# Patient Record
Sex: FEMALE | Race: WHITE | NOT HISPANIC OR LATINO | Employment: FULL TIME | URBAN - METROPOLITAN AREA
[De-identification: names, ages, dates, MRNs, and addresses within clinical notes are randomized per-mention and may not be internally consistent; named-entity substitution may affect disease eponyms.]

---
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
## 2019-02-15 ENCOUNTER — PATIENT MESSAGE (OUTPATIENT)
Dept: FAMILY MEDICINE | Facility: CLINIC | Age: 19
End: 2019-02-15

## 2019-02-16 RX ORDER — ACETAMINOPHEN AND CODEINE PHOSPHATE 120; 12 MG/5ML; MG/5ML
1 SOLUTION ORAL DAILY
Qty: 84 TABLET | Refills: 0 | Status: SHIPPED | OUTPATIENT
Start: 2019-02-16 | End: 2020-01-09

## 2019-03-11 NOTE — TELEPHONE ENCOUNTER
Advised patients mom through my chart.  
Patients mom is needing to switch her daughters birth control. Stated she received it in the mail but she asked for the wrong kind. Daughter can not do Marti because she breaks out. She is requesting we call in Viorele this time, enough to get her to her appt. I informed patients mother I attempted to call [facility] to phone in prescription but Pharmacy tech by the name of Aleyda stated  had to send another prescription if it was being switched. I explained to the tech I was calling from 's office and it had been done before so she gave me a different number which was incorrect. Patients mom is aware Dr. Arevalo will not be back in the office until Thursday morning only. Please advise.  
RX sent to [facility]  
Spoke with the pharmacy waleska Maynard who stated new prescription had to be sent from 's office for Viorele.    
I have reviewed the history, physical exam, assessment and management plans.  I concur with or have edited all elements of her note.

## 2019-03-15 ENCOUNTER — OFFICE VISIT (OUTPATIENT)
Dept: OBSTETRICS AND GYNECOLOGY | Facility: CLINIC | Age: 19
End: 2019-03-15
Payer: COMMERCIAL

## 2019-03-15 VITALS
DIASTOLIC BLOOD PRESSURE: 68 MMHG | SYSTOLIC BLOOD PRESSURE: 116 MMHG | BODY MASS INDEX: 23.91 KG/M2 | HEIGHT: 65 IN | WEIGHT: 143.5 LBS

## 2019-03-15 DIAGNOSIS — Z30.09 ENCOUNTER FOR OTHER GENERAL COUNSELING OR ADVICE ON CONTRACEPTION: Primary | ICD-10-CM

## 2019-03-15 PROCEDURE — 99212 PR OFFICE/OUTPT VISIT, EST, LEVL II, 10-19 MIN: ICD-10-PCS | Mod: S$GLB,,, | Performed by: OBSTETRICS & GYNECOLOGY

## 2019-03-15 PROCEDURE — 99999 PR PBB SHADOW E&M-EST. PATIENT-LVL III: CPT | Mod: PBBFAC,,, | Performed by: OBSTETRICS & GYNECOLOGY

## 2019-03-15 PROCEDURE — 99212 OFFICE O/P EST SF 10 MIN: CPT | Mod: S$GLB,,, | Performed by: OBSTETRICS & GYNECOLOGY

## 2019-03-15 PROCEDURE — 3008F BODY MASS INDEX DOCD: CPT | Mod: CPTII,S$GLB,, | Performed by: OBSTETRICS & GYNECOLOGY

## 2019-03-15 PROCEDURE — 99999 PR PBB SHADOW E&M-EST. PATIENT-LVL III: ICD-10-PCS | Mod: PBBFAC,,, | Performed by: OBSTETRICS & GYNECOLOGY

## 2019-03-15 PROCEDURE — 3008F PR BODY MASS INDEX (BMI) DOCUMENTED: ICD-10-PCS | Mod: CPTII,S$GLB,, | Performed by: OBSTETRICS & GYNECOLOGY

## 2019-03-15 NOTE — PROGRESS NOTES
Chief Complaint   Patient presents with    Contraception       HPI:   Mar Luna 18 y.o.  is here to get second opinion on birth control options. She reports she was on combined ocps for 4 years and she had a lot of mood fluctuations on them and she has a h/o migraines. All of which seemed to be improved once she stopped them. She has been on the mini pill for a year and has occasional AUB on the mini pill. She sometimes is an hour or so late taking it. She can't do needles and doesn't want an implant.       Patient's last menstrual period was 01/30/2019 (exact date).     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    diagnosed by psychiatry    Asthma     mostly exercise-induced now    Dyslexia     Headache, tension-type     Worsening headaches 12/16/2016    followed by neurology       Past Surgical History:   Procedure Laterality Date    TONSILLECTOMY  0 7/2017       Family History   Problem Relation Age of Onset    Breast cancer Paternal Grandmother     Cancer Paternal Grandmother 65        breast    Headaches Mother     No Known Problems Father     Cancer Maternal Grandfather         passed age 74    No Known Problems Sister     No Known Problems Brother     Colon cancer Neg Hx     Ovarian cancer Neg Hx        Social History     Socioeconomic History    Marital status: Single     Spouse name: Not on file    Number of children: Not on file    Years of education: Not on file    Highest education level: Not on file   Social Needs    Financial resource strain: Not on file    Food insecurity - worry: Not on file    Food insecurity - inability: Not on file    Transportation needs - medical: Not on file    Transportation needs - non-medical: Not on file   Occupational History    Not on file   Tobacco Use    Smoking status: Never Smoker    Smokeless tobacco: Never Used   Substance and Sexual Activity    Alcohol use: No    Drug use: No     "Sexual activity: Yes     Partners: Male     Birth control/protection: OCP     Comment: Mother not aware   Other Topics Concern    Not on file   Social History Narrative    Senior year at [employer]       OB History      Para Term  AB Living    0 0 0 0 0 0    SAB TAB Ectopic Multiple Live Births    0 0 0 0             COMPREHENSIVE GYN HISTORY:  PAP History: Denies abnormal Paps.  Infection History: Denies STDs. Denies PID.  Benign History: Denies uterine fibroids. Denies ovarian cysts. Denies endometriosis.   Cancer History: Denies cervical cancer. Denies uterine cancer or hyperplasia. Denies ovarian cancer. Denies vulvar cancer or pre-cancer. Denies vaginal cancer or pre-cancer. Denies breast cancer. Denies colon cancer.  Sexual Activity History:   reports that she currently engages in sexual activity and has had partners who are Male. She reports using the following method of birth control/protection: OCP.   Menstrual History: Age of menarche: 11 years. Every 28 days, flows for 5-7 days. heavy flow.  Dysmenorrhea History: Reports severe dysmenorrhea.  Contraception: mini pill       ROS:  GENERAL: Denies weight gain or weight loss. Feeling well overall.   SKIN: Denies rash or lesions.   HEAD: Denies headache.   CHEST: Denies chest pain   RESPIRATORY: Denies shortness of breath  ABDOMEN: No abdominal pain, constipation, diarrhea, nausea, vomiting or rectal bleeding.   URINARY: No frequency, dysuria, hematuria, or burning on urination.  REPRODUCTIVE: See HPI.   All other ROS negative     PE:   /68   Ht 5' 5" (1.651 m)   Wt 65.1 kg (143 lb 8.3 oz)   LMP 2019 (Exact Date)   BMI 23.88 kg/m²     APPEARANCE: Well nourished, well developed, in no acute distress.  Pelvic: declines exam     UPT: negative     1. Encounter for other general counseling or advice on contraception        Plan:  1. We discussed for 10 minutes the various forms of birth control including mini pill vs combined " pill/patch/nuvaring vs depo-provera, nexplanon and IUDs and condoms. Discussed with her that AUB on progesterone only birth control is very normal but we could do exam and US however she declines. She would like to stay on mini pill for now.

## 2019-05-06 DIAGNOSIS — R51.9 BILATERAL HEADACHE: ICD-10-CM

## 2019-05-06 RX ORDER — AMITRIPTYLINE HYDROCHLORIDE 75 MG/1
75 TABLET ORAL NIGHTLY
Qty: 90 TABLET | Refills: 0 | Status: SHIPPED | OUTPATIENT
Start: 2019-05-06 | End: 2019-08-07 | Stop reason: SDUPTHER

## 2019-08-06 ENCOUNTER — PATIENT MESSAGE (OUTPATIENT)
Dept: NEUROLOGY | Facility: CLINIC | Age: 19
End: 2019-08-06

## 2019-08-06 ENCOUNTER — TELEPHONE (OUTPATIENT)
Dept: FAMILY MEDICINE | Facility: CLINIC | Age: 19
End: 2019-08-06

## 2019-08-07 ENCOUNTER — OFFICE VISIT (OUTPATIENT)
Dept: FAMILY MEDICINE | Facility: CLINIC | Age: 19
End: 2019-08-07
Payer: COMMERCIAL

## 2019-08-07 ENCOUNTER — OFFICE VISIT (OUTPATIENT)
Dept: NEUROLOGY | Facility: CLINIC | Age: 19
End: 2019-08-07
Payer: COMMERCIAL

## 2019-08-07 VITALS
BODY MASS INDEX: 23.13 KG/M2 | HEIGHT: 66 IN | SYSTOLIC BLOOD PRESSURE: 132 MMHG | DIASTOLIC BLOOD PRESSURE: 81 MMHG | HEART RATE: 103 BPM | WEIGHT: 143.94 LBS

## 2019-08-07 VITALS
BODY MASS INDEX: 24.12 KG/M2 | SYSTOLIC BLOOD PRESSURE: 116 MMHG | DIASTOLIC BLOOD PRESSURE: 86 MMHG | WEIGHT: 144.75 LBS | HEART RATE: 84 BPM | OXYGEN SATURATION: 99 % | TEMPERATURE: 99 F | HEIGHT: 65 IN

## 2019-08-07 DIAGNOSIS — L23.9 ALLERGIC CONTACT DERMATITIS, UNSPECIFIED TRIGGER: Primary | ICD-10-CM

## 2019-08-07 DIAGNOSIS — R48.0 DYSLEXIA: Primary | ICD-10-CM

## 2019-08-07 DIAGNOSIS — R51.9 BILATERAL HEADACHE: ICD-10-CM

## 2019-08-07 PROCEDURE — 99213 OFFICE O/P EST LOW 20 MIN: CPT | Mod: S$GLB,,, | Performed by: NURSE PRACTITIONER

## 2019-08-07 PROCEDURE — 3008F BODY MASS INDEX DOCD: CPT | Mod: CPTII,S$GLB,, | Performed by: PSYCHIATRY & NEUROLOGY

## 2019-08-07 PROCEDURE — 99999 PR PBB SHADOW E&M-EST. PATIENT-LVL V: CPT | Mod: PBBFAC,,, | Performed by: NURSE PRACTITIONER

## 2019-08-07 PROCEDURE — 3008F PR BODY MASS INDEX (BMI) DOCUMENTED: ICD-10-PCS | Mod: CPTII,S$GLB,, | Performed by: PSYCHIATRY & NEUROLOGY

## 2019-08-07 PROCEDURE — 99213 PR OFFICE/OUTPT VISIT, EST, LEVL III, 20-29 MIN: ICD-10-PCS | Mod: S$GLB,,, | Performed by: PSYCHIATRY & NEUROLOGY

## 2019-08-07 PROCEDURE — 99999 PR PBB SHADOW E&M-EST. PATIENT-LVL III: ICD-10-PCS | Mod: PBBFAC,,, | Performed by: PSYCHIATRY & NEUROLOGY

## 2019-08-07 PROCEDURE — 3008F PR BODY MASS INDEX (BMI) DOCUMENTED: ICD-10-PCS | Mod: CPTII,S$GLB,, | Performed by: NURSE PRACTITIONER

## 2019-08-07 PROCEDURE — 3008F BODY MASS INDEX DOCD: CPT | Mod: CPTII,S$GLB,, | Performed by: NURSE PRACTITIONER

## 2019-08-07 PROCEDURE — 99213 PR OFFICE/OUTPT VISIT, EST, LEVL III, 20-29 MIN: ICD-10-PCS | Mod: S$GLB,,, | Performed by: NURSE PRACTITIONER

## 2019-08-07 PROCEDURE — 99213 OFFICE O/P EST LOW 20 MIN: CPT | Mod: S$GLB,,, | Performed by: PSYCHIATRY & NEUROLOGY

## 2019-08-07 PROCEDURE — 99999 PR PBB SHADOW E&M-EST. PATIENT-LVL V: ICD-10-PCS | Mod: PBBFAC,,, | Performed by: NURSE PRACTITIONER

## 2019-08-07 PROCEDURE — 99999 PR PBB SHADOW E&M-EST. PATIENT-LVL III: CPT | Mod: PBBFAC,,, | Performed by: PSYCHIATRY & NEUROLOGY

## 2019-08-07 RX ORDER — SUMATRIPTAN 50 MG/1
50 TABLET, FILM COATED ORAL ONCE
Qty: 15 TABLET | Refills: 5 | Status: SHIPPED | OUTPATIENT
Start: 2019-08-07 | End: 2019-09-09 | Stop reason: SDUPTHER

## 2019-08-07 RX ORDER — LANOLIN ALCOHOL/MO/W.PET/CERES
400 CREAM (GRAM) TOPICAL DAILY
Qty: 90 TABLET | Refills: 3 | Status: SHIPPED | OUTPATIENT
Start: 2019-08-07 | End: 2020-01-09

## 2019-08-07 RX ORDER — RIZATRIPTAN BENZOATE 10 MG/1
10 TABLET ORAL
Qty: 9 TABLET | Refills: 2 | Status: SHIPPED | OUTPATIENT
Start: 2019-08-07 | End: 2021-02-19 | Stop reason: ALTCHOICE

## 2019-08-07 RX ORDER — RIBOFLAVIN (VITAMIN B2) 100 MG
200 TABLET ORAL DAILY
Qty: 180 TABLET | Refills: 3 | Status: SHIPPED | OUTPATIENT
Start: 2019-08-07 | End: 2020-01-09

## 2019-08-07 RX ORDER — HYDROXYZINE HYDROCHLORIDE 25 MG/1
25 TABLET, FILM COATED ORAL 3 TIMES DAILY PRN
Qty: 45 TABLET | Refills: 0 | Status: SHIPPED | OUTPATIENT
Start: 2019-08-07 | End: 2020-01-09

## 2019-08-07 RX ORDER — AMITRIPTYLINE HYDROCHLORIDE 75 MG/1
75 TABLET ORAL NIGHTLY
Qty: 90 TABLET | Refills: 3 | Status: SHIPPED | OUTPATIENT
Start: 2019-08-07 | End: 2019-09-09 | Stop reason: SDUPTHER

## 2019-08-07 RX ORDER — AMITRIPTYLINE HYDROCHLORIDE 75 MG/1
75 TABLET ORAL NIGHTLY
Qty: 90 TABLET | Refills: 3 | Status: SHIPPED | OUTPATIENT
Start: 2019-08-07 | End: 2019-08-07 | Stop reason: SDUPTHER

## 2019-08-07 RX ORDER — METHYLPREDNISOLONE 4 MG/1
TABLET ORAL
Qty: 1 PACKAGE | Refills: 0 | Status: SHIPPED | OUTPATIENT
Start: 2019-08-07 | End: 2019-08-28

## 2019-08-07 NOTE — ASSESSMENT & PLAN NOTE
--  Continue current amitriptyline  -- continue current magnesium and riboflavin  -- continue caffeine avoidance

## 2019-08-07 NOTE — PROGRESS NOTES
Subjective:       Patient ID: Mar Luna is a 19 y.o. female.    Chief Complaint: Rash (rash on both legs.)    18 y/o female with history of asthma presents to clinic with c/o rash.    Rash   This is a new problem. The current episode started in the past 7 days. The problem has been gradually improving since onset. The rash is diffuse. The rash is characterized by itchiness and redness. It is unknown if there was an exposure to a precipitant. Pertinent negatives include no cough, diarrhea, fever, joint pain or shortness of breath. Past treatments include nothing.       Current Outpatient Medications   Medication Sig Dispense Refill    albuterol (VENTOLIN HFA) 90 mcg/actuation inhaler Inhale 2 puffs into the lungs every 6 (six) hours as needed for Wheezing. 18 g 3    amitriptyline (ELAVIL) 75 MG tablet Take 1 tablet (75 mg total) by mouth every evening. NEEDS APT FOR FURTHER REFILLS 90 tablet 0    ascorbic acid (VITAMIN C) 500 MG tablet Take 500 mg by mouth once daily.      CHOLECALCIFEROL, VITAMIN D3, (VITAMIN D3 ORAL) Take 1 capsule by mouth once daily.      cyanocobalamin (VITAMIN B-12) 1000 MCG tablet Take 100 mcg by mouth once daily.      fish oil-omega-3 fatty acids 300-1,000 mg capsule Take 1 g by mouth once daily.      garlic 1,000 mg Cap Take 1 capsule by mouth once daily.      hydrOXYzine HCl (ATARAX) 25 MG tablet Take 1 tablet (25 mg total) by mouth 3 (three) times daily as needed for Itching. 45 tablet 0    magnesium oxide (MAG-OX) 400 mg tablet Take 1 tablet (400 mg total) by mouth once daily. 90 tablet 3    methylPREDNISolone (MEDROL DOSEPACK) 4 mg tablet use as directed 1 Package 0    norethindrone (ORTHO MICRONOR) 0.35 mg tablet Take 1 tablet (0.35 mg total) by mouth once daily. 84 tablet 0    riboflavin, vitamin B2, 100 mg Tab tablet Take 2 tablets (200 mg total) by mouth once daily. 180 tablet 3    rizatriptan (MAXALT) 10 MG tablet Take 1 tablet (10 mg total) by mouth as needed for  Migraine. 9 tablet 2    sumatriptan (IMITREX) 50 MG tablet Take 1 tablet (50 mg total) by mouth once. Take tablet at onset of Headache then every 2 hours that headache persists. 15 tablet 5    UNABLE TO FIND Orthotic inserts to be worn daily. 1 Pair 0     No current facility-administered medications for this visit.        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    diagnosed by psychiatry    Asthma     mostly exercise-induced now    Dyslexia     Headache, tension-type     Worsening headaches 12/16/2016    followed by neurology       Past Surgical History:   Procedure Laterality Date    TONSILLECTOMY  0 7/2017       Family History   Problem Relation Age of Onset    Breast cancer Paternal Grandmother     Cancer Paternal Grandmother 65        breast    Headaches Mother     No Known Problems Father     Cancer Maternal Grandfather         passed age 74    No Known Problems Sister     No Known Problems Brother     Colon cancer Neg Hx     Ovarian cancer Neg Hx        Social History     Socioeconomic History    Marital status: Single     Spouse name: Not on file    Number of children: Not on file    Years of education: Not on file    Highest education level: Not on file   Occupational History    Not on file   Social Needs    Financial resource strain: Not on file    Food insecurity:     Worry: Not on file     Inability: Not on file    Transportation needs:     Medical: Not on file     Non-medical: Not on file   Tobacco Use    Smoking status: Never Smoker    Smokeless tobacco: Never Used   Substance and Sexual Activity    Alcohol use: No    Drug use: No    Sexual activity: Yes     Partners: Male     Birth control/protection: OCP     Comment: Mother not aware   Lifestyle    Physical activity:     Days per week: Not on file     Minutes per session: Not on file    Stress: Not on file   Relationships    Social connections:     Talks on phone: Not on  "file     Gets together: Not on file     Attends Sikh service: Not on file     Active member of club or organization: Not on file     Attends meetings of clubs or organizations: Not on file     Relationship status: Not on file   Other Topics Concern    Not on file   Social History Narrative    Senior year at [employer]       Review of Systems   Constitutional: Negative for fever.   HENT: Negative for nosebleeds, postnasal drip, tinnitus and trouble swallowing.    Respiratory: Negative for cough and shortness of breath.    Cardiovascular: Negative for chest pain and leg swelling.   Gastrointestinal: Negative for abdominal pain and diarrhea.   Musculoskeletal: Negative for arthralgias and joint pain.   Skin: Positive for rash.   Neurological: Negative for headaches.   Hematological: Negative for adenopathy. Does not bruise/bleed easily.   Psychiatric/Behavioral: Negative for dysphoric mood.         Objective:     Vitals:    08/07/19 1030   BP: 116/86   Pulse: 84   Temp: 98.6 °F (37 °C)   TempSrc: Oral   SpO2: 99%   Weight: 65.6 kg (144 lb 11.7 oz)   Height: 5' 5" (1.651 m)          Physical Exam   Constitutional: She is oriented to person, place, and time. She appears well-developed and well-nourished.   HENT:   Head: Normocephalic.   Eyes: Pupils are equal, round, and reactive to light. Conjunctivae are normal.   Neck: Normal range of motion. Neck supple.   Cardiovascular: Normal rate and regular rhythm.   Pulmonary/Chest: Effort normal and breath sounds normal.   Musculoskeletal: Normal range of motion.   Neurological: She is alert and oriented to person, place, and time.   Skin: Skin is intact. Capillary refill takes less than 2 seconds. Rash noted. Rash is papular.   Psychiatric: She has a normal mood and affect. Her behavior is normal.         Assessment:         ICD-10-CM ICD-9-CM   1. Allergic contact dermatitis, unspecified trigger L23.9 692.9       Plan:       Allergic contact dermatitis, unspecified " trigger  -     methylPREDNISolone (MEDROL DOSEPACK) 4 mg tablet; use as directed  Dispense: 1 Package; Refill: 0  -     hydrOXYzine HCl (ATARAX) 25 MG tablet; Take 1 tablet (25 mg total) by mouth 3 (three) times daily as needed for Itching.  Dispense: 45 tablet; Refill: 0      Follow up if symptoms worsen or fail to improve.     Patient's Medications   New Prescriptions    HYDROXYZINE HCL (ATARAX) 25 MG TABLET    Take 1 tablet (25 mg total) by mouth 3 (three) times daily as needed for Itching.    METHYLPREDNISOLONE (MEDROL DOSEPACK) 4 MG TABLET    use as directed   Previous Medications    ALBUTEROL (VENTOLIN HFA) 90 MCG/ACTUATION INHALER    Inhale 2 puffs into the lungs every 6 (six) hours as needed for Wheezing.    AMITRIPTYLINE (ELAVIL) 75 MG TABLET    Take 1 tablet (75 mg total) by mouth every evening. NEEDS APT FOR FURTHER REFILLS    ASCORBIC ACID (VITAMIN C) 500 MG TABLET    Take 500 mg by mouth once daily.    CHOLECALCIFEROL, VITAMIN D3, (VITAMIN D3 ORAL)    Take 1 capsule by mouth once daily.    CYANOCOBALAMIN (VITAMIN B-12) 1000 MCG TABLET    Take 100 mcg by mouth once daily.    FISH OIL-OMEGA-3 FATTY ACIDS 300-1,000 MG CAPSULE    Take 1 g by mouth once daily.    GARLIC 1,000 MG CAP    Take 1 capsule by mouth once daily.    MAGNESIUM OXIDE (MAG-OX) 400 MG TABLET    Take 1 tablet (400 mg total) by mouth once daily.    NORETHINDRONE (ORTHO MICRONOR) 0.35 MG TABLET    Take 1 tablet (0.35 mg total) by mouth once daily.    RIBOFLAVIN, VITAMIN B2, 100 MG TAB TABLET    Take 2 tablets (200 mg total) by mouth once daily.    RIZATRIPTAN (MAXALT) 10 MG TABLET    Take 1 tablet (10 mg total) by mouth as needed for Migraine.    SUMATRIPTAN (IMITREX) 50 MG TABLET    Take 1 tablet (50 mg total) by mouth once. Take tablet at onset of Headache then every 2 hours that headache persists.    UNABLE TO FIND    Orthotic inserts to be worn daily.   Modified Medications    No medications on file   Discontinued Medications    No  medications on file

## 2019-08-07 NOTE — PROGRESS NOTES
[facility] NEUROLOGY  [facility] [place]    Date: August 7, 2019   Patient Name: Mar Luna   MRN: [id number]   PCP: Jennifer Coombs  Referring Provider: No ref. provider found    Assessment:   Mar Luna is a 19 y.o. female Presenting in follow-up for the management of chronic migraine headaches.  Patient is currently well controlled.  Will make no changes to her current regimen.  All questions were answered.    Plan:     Problem List Items Addressed This Visit        Neuro    Chronic migraine    Current Assessment & Plan     --  Continue current amitriptyline  -- continue current magnesium and riboflavin  -- continue caffeine avoidance           Other Visit Diagnoses     Dyslexia    -  Primary    Relevant Orders    Ambulatory referral to Psychiatry    Bilateral headache        Relevant Medications    sumatriptan (IMITREX) 50 MG tablet    amitriptyline (ELAVIL) 75 MG tablet        Franklin Kwok MD  [facility]   Department of Neurology    Patient note was created using Dragon Dictation.  Any errors in syntax or even information may not have been identified and edited on initial review prior to signing this note.  Subjective:   HPI:   Ms. Mar Luna is a 19 y.o. female presenting in follow-up for management of chronic migraine headaches.  Patient reports that she is extremely well controlled on her current regimen of magnesium, riboflavin, amitriptyline.  She states that she only experiences a breakthrough headache when she consumes caffeine.  Her headaches rapidly responded Imitrex and Maxalt.  She has no other complaints today.  She does requests referral to Psychiatry who she has seen in the past for evaluation of dyslexia and adjustment disorder.    PAST MEDICAL HISTORY:  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    diagnosed by psychiatry    Asthma     mostly exercise-induced now    Dyslexia     Headache, tension-type      Worsening headaches 12/16/2016    followed by neurology     PAST SURGICAL HISTORY:  Past Surgical History:   Procedure Laterality Date    TONSILLECTOMY  0 7/2017       CURRENT MEDS:  Current Outpatient Medications   Medication Sig Dispense Refill    albuterol (VENTOLIN HFA) 90 mcg/actuation inhaler Inhale 2 puffs into the lungs every 6 (six) hours as needed for Wheezing. 18 g 3    amitriptyline (ELAVIL) 75 MG tablet Take 1 tablet (75 mg total) by mouth every evening. 90 tablet 3    ascorbic acid (VITAMIN C) 500 MG tablet Take 500 mg by mouth once daily.      CHOLECALCIFEROL, VITAMIN D3, (VITAMIN D3 ORAL) Take 1 capsule by mouth once daily.      cyanocobalamin (VITAMIN B-12) 1000 MCG tablet Take 100 mcg by mouth once daily.      fish oil-omega-3 fatty acids 300-1,000 mg capsule Take 1 g by mouth once daily.      garlic 1,000 mg Cap Take 1 capsule by mouth once daily.      hydrOXYzine HCl (ATARAX) 25 MG tablet Take 1 tablet (25 mg total) by mouth 3 (three) times daily as needed for Itching. 45 tablet 0    magnesium oxide (MAG-OX) 400 mg (241.3 mg magnesium) tablet Take 1 tablet (400 mg total) by mouth once daily. 90 tablet 3    methylPREDNISolone (MEDROL DOSEPACK) 4 mg tablet use as directed 1 Package 0    norethindrone (ORTHO MICRONOR) 0.35 mg tablet Take 1 tablet (0.35 mg total) by mouth once daily. 84 tablet 0    riboflavin, vitamin B2, (VITAMIN B-2) 100 mg Tab tablet Take 2 tablets (200 mg total) by mouth once daily. 180 tablet 3    sumatriptan (IMITREX) 50 MG tablet Take 1 tablet (50 mg total) by mouth once. Take tablet at onset of Headache then every 2 hours that headache persists. for 1 dose 15 tablet 5    UNABLE TO FIND Orthotic inserts to be worn daily. 1 Pair 0    rizatriptan (MAXALT) 10 MG tablet Take 1 tablet (10 mg total) by mouth as needed for Migraine. 9 tablet 2     No current facility-administered medications for this visit.      ALLERGIES:  Review of patient's allergies indicates:  "  Allergen Reactions    Codeine Rash     FAMILY HISTORY:  Family History   Problem Relation Age of Onset    Breast cancer Paternal Grandmother     Cancer Paternal Grandmother 65        breast    Headaches Mother     No Known Problems Father     Cancer Maternal Grandfather         passed age 74    No Known Problems Sister     No Known Problems Brother     Colon cancer Neg Hx     Ovarian cancer Neg Hx        SOCIAL HISTORY:  Social History     Tobacco Use    Smoking status: Never Smoker    Smokeless tobacco: Never Used   Substance Use Topics    Alcohol use: No    Drug use: No     Review of Systems:  12 review of systems is negative except for the symptoms mentioned in HPI.      Objective:     Vitals:    08/07/19 1442   BP: 132/81   Pulse: 103   Weight: 65.3 kg (143 lb 15.4 oz)   Height: 5' 6" (1.676 m)     General: NAD, well nourished   Eyes: no tearing, discharge, no erythema   ENT: moist mucous membranes of the oral cavity, nares patent    Neck: Supple, full range of motion  Cardiovascular: Warm and well perfused, pulses equal and symmetrical  Lungs: Normal work of breathing, normal chest wall excursions  Skin: No rash, lesions, or breakdown on exposed skin  Psychiatry: Mood and affect are appropriate   Abdomen: soft, non tender, non distended  Extremeties: No cyanosis, clubbing or edema.    Neurological   MENTAL STATUS: Alert and oriented to person, place, and time. Attention and concentration within normal limits. Speech without dysarthria. Recent and remote memory within normal limits   CRANIAL NERVES: Visual fields intact. PERRL. EOMI. Facial sensation intact. Face symmetrical. Hearing grossly intact. Full shoulder shrug bilaterally. Tongue protrudes midline   SENSORY: Sensation is intact to light touch throughout.    MOTOR: Normal bulk and tone.  5/5 deltoid, biceps, triceps, interosseous, hand  bilaterally. 5/5 iliopsoas, knee extension/flexion, foot dorsi/plantarflexion bilaterally.  "   REFLEXES: Symmetric and 2+ throughout.   CEREBELLAR/COORDINATION/GAIT: Gait steady with normal arm swing and stride length. Finger to nose intact. Normal rapid alternating movements.

## 2019-08-07 NOTE — PATIENT INSTRUCTIONS
"  Contact Dermatitis  Contact dermatitis is a skin rash caused by something that touches the skin and makes it irritated and inflamed. Your skin may be red, swollen, dry, and may be cracked. Blisters may form and ooze. The rash will itch.  Contact dermatitis can form on the face and neck, backs of hands, forearms, genitals, and lower legs.  People can get contact dermatitis from lots of sources. These include:  · Plants such as poison ivy, oak, or sumac  · Chemicals in hair dyes and rinses, soaps, solvents, waxes, fingernail polish, and deodorants   · Jewelry or watchbands made of nickel  Contact dermatitis is not passed from person to person.  Talk with your healthcare provider about what may have caused the rash. A type of allergy testing called "patch testing" may be used to discover what you are allergic to. You will need to avoid the source of your rash in the future to prevent it from coming back.  Treatment is done to relieve itching and prevent the rash from coming back. The rash should go away in a few days to a few weeks.  Home care  Your healthcare provider may prescribe medicine to relieve swelling and itching. Follow all instructions when using these medicines.  General care:  · Avoid anything that heats up your skin, such as hot showers or baths, or direct sunlight. This can make itching worse.  · Apply cold compresses to soothe your sores to help relieve your symptoms. Do this for 30 minutes 3 to 4 times a day. You can make a cold compress by soaking a cloth in cold water. Squeeze out excess water. You can add colloidal oatmeal to the water to help reduce itching. For severe itching in a small area, apply an ice pack wrapped in a thin towel. Do this for 20 minutes 3 to 4 times a day.  · You can also try wet dressings. One way to do this is to wear a wet piece of clothing under a dry one. Wear a damp shirt under a dry shirt if your upper body is affected. This can relieve itching and prevent you from " scratching the affected area.  · You can also help relieve large areas of itching by taking a lukewarm bath with colloidal oatmeal added to the water.  · Use hydrocortisone cream for redness and irritation, unless another medicine was prescribed. You can also use benzocaine anesthetic cream or spray. Calamine lotion can also relieve mild symptoms.  · Use oral diphenhydramine to help reduce itching. You can buy this antihistamine at drug and grocery stores. It can make you sleepy, so use lower doses during the daytime. Or you can use loratadine. This is an antihistamine that will not make you sleepy. Do not use diphenhydramine if you have glaucoma or have trouble urinating due to an enlarged prostate.  · If a plant causes your rash, make sure to wash your skin and the clothes you were wearing when you came into contact with the plant. This is to wash away the plant oils that gave you the rash and prevent more or worse symptoms.  · Stay away from the substance or object that causes your symptoms. If you cant avoid it, wear gloves or some other type of protection.  Follow-up care  Follow up with your healthcare provider, or as advised.  When to seek medical advice  Call your healthcare provider right away if any of these occur:  · Spreading of the rash to other parts of your body  · Severe swelling of your face, eyelids, mouth, throat or tongue  · Trouble urinating due to swelling in the genital area  · Fever of 100.4°F (38°C) or higher  · Redness or swelling that gets worse  · Pain that gets worse  · Foul-smelling fluid leaking from the skin  · Yellow-brown crusts on the open blisters  Date Last Reviewed: 9/1/2016  © 4590-4742 [employer]. [address] [place] [address], [place] [address]. All rights reserved. This information is not intended as a substitute for professional medical care. Always follow your healthcare professional's instructions.

## 2019-09-04 ENCOUNTER — PATIENT MESSAGE (OUTPATIENT)
Dept: NEUROLOGY | Facility: CLINIC | Age: 19
End: 2019-09-04

## 2019-09-04 DIAGNOSIS — R51.9 BILATERAL HEADACHE: ICD-10-CM

## 2019-09-09 RX ORDER — AMITRIPTYLINE HYDROCHLORIDE 75 MG/1
75 TABLET ORAL NIGHTLY
Qty: 90 TABLET | Refills: 3 | Status: SHIPPED | OUTPATIENT
Start: 2019-09-09 | End: 2020-01-09

## 2019-09-09 RX ORDER — SUMATRIPTAN 50 MG/1
50 TABLET, FILM COATED ORAL ONCE
Qty: 15 TABLET | Refills: 5 | Status: SHIPPED | OUTPATIENT
Start: 2019-09-09 | End: 2020-11-24 | Stop reason: SDUPTHER

## 2020-01-02 ENCOUNTER — TELEPHONE (OUTPATIENT)
Dept: OBSTETRICS AND GYNECOLOGY | Facility: CLINIC | Age: 20
End: 2020-01-02

## 2020-01-02 NOTE — TELEPHONE ENCOUNTER
----- Message from Rian Crowe Patient Care Assistant sent at 1/2/2020  9:19 AM CST -----  Contact: Patient Portal  ----- Message -----     From: Mar Luna     Sent: 1/1/2020 10:26 AM CST       To: Patient Appointment Schedule Request Mailing List  Subject: Appointment Request    Appointment Request From: Mar Luna    With Provider: Ava    Preferred Date Range: 1/1/2020 - 1/10/2020    Preferred Times: Any time    Reason for visit: Positive pregnancy test    Comments:  Pregnancy

## 2020-01-02 NOTE — TELEPHONE ENCOUNTER
Called pt. Scheduled pt 1/13/20 at 1400 in [facility]. Pt verbalized understanding.

## 2020-01-09 ENCOUNTER — PATIENT MESSAGE (OUTPATIENT)
Dept: OBSTETRICS AND GYNECOLOGY | Facility: CLINIC | Age: 20
End: 2020-01-09

## 2020-01-09 ENCOUNTER — OFFICE VISIT (OUTPATIENT)
Dept: OBSTETRICS AND GYNECOLOGY | Facility: CLINIC | Age: 20
End: 2020-01-09
Payer: COMMERCIAL

## 2020-01-09 VITALS
SYSTOLIC BLOOD PRESSURE: 110 MMHG | BODY MASS INDEX: 23.69 KG/M2 | DIASTOLIC BLOOD PRESSURE: 68 MMHG | WEIGHT: 146.81 LBS

## 2020-01-09 DIAGNOSIS — Z32.01 POSITIVE URINE PREGNANCY TEST: ICD-10-CM

## 2020-01-09 DIAGNOSIS — N91.2 AMENORRHEA: Primary | ICD-10-CM

## 2020-01-09 DIAGNOSIS — Z01.419 WELL WOMAN EXAM WITH ROUTINE GYNECOLOGICAL EXAM: ICD-10-CM

## 2020-01-09 LAB
B-HCG UR QL: POSITIVE
CTP QC/QA: YES

## 2020-01-09 PROCEDURE — 87086 URINE CULTURE/COLONY COUNT: CPT

## 2020-01-09 PROCEDURE — 99395 PR PREVENTIVE VISIT,EST,18-39: ICD-10-PCS | Mod: S$GLB,,, | Performed by: OBSTETRICS & GYNECOLOGY

## 2020-01-09 PROCEDURE — 99999 PR PBB SHADOW E&M-EST. PATIENT-LVL III: CPT | Mod: PBBFAC,,, | Performed by: OBSTETRICS & GYNECOLOGY

## 2020-01-09 PROCEDURE — 99999 PR PBB SHADOW E&M-EST. PATIENT-LVL III: ICD-10-PCS | Mod: PBBFAC,,, | Performed by: OBSTETRICS & GYNECOLOGY

## 2020-01-09 PROCEDURE — 81025 POCT URINE PREGNANCY: ICD-10-PCS | Mod: S$GLB,,, | Performed by: OBSTETRICS & GYNECOLOGY

## 2020-01-09 PROCEDURE — 81025 URINE PREGNANCY TEST: CPT | Mod: S$GLB,,, | Performed by: OBSTETRICS & GYNECOLOGY

## 2020-01-09 PROCEDURE — 99395 PREV VISIT EST AGE 18-39: CPT | Mod: S$GLB,,, | Performed by: OBSTETRICS & GYNECOLOGY

## 2020-01-09 PROCEDURE — 87491 CHLMYD TRACH DNA AMP PROBE: CPT | Mod: 59

## 2020-01-09 PROCEDURE — 80307 DRUG TEST PRSMV CHEM ANLYZR: CPT

## 2020-01-09 PROCEDURE — 88175 CYTOPATH C/V AUTO FLUID REDO: CPT

## 2020-01-09 PROCEDURE — 87481 CANDIDA DNA AMP PROBE: CPT | Mod: 59

## 2020-01-09 NOTE — PROGRESS NOTES
CC: Annual check-up    SUBJECTIVE:   19 y.o. female   for annual routine Pap and checkup. Patient's last menstrual period was 2019 (exact date)..  EGA is 8 6/7 wks  and EDC is 19. She has no unusual complaints.        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    diagnosed by psychiatry    Asthma     mostly exercise-induced now    Dyslexia     Headache, tension-type     Worsening headaches 2016    followed by neurology     Past Surgical History:   Procedure Laterality Date    TONSILLECTOMY  0 2017     Social History     Socioeconomic History    Marital status: Single     Spouse name: Not on file    Number of children: Not on file    Years of education: Not on file    Highest education level: Not on file   Occupational History    Not on file   Social Needs    Financial resource strain: Not on file    Food insecurity:     Worry: Not on file     Inability: Not on file    Transportation needs:     Medical: Not on file     Non-medical: Not on file   Tobacco Use    Smoking status: Never Smoker    Smokeless tobacco: Never Used   Substance and Sexual Activity    Alcohol use: No    Drug use: No    Sexual activity: Yes     Partners: Male     Birth control/protection: OCP     Comment: Mother not aware   Lifestyle    Physical activity:     Days per week: Not on file     Minutes per session: Not on file    Stress: Not on file   Relationships    Social connections:     Talks on phone: Not on file     Gets together: Not on file     Attends Oriental orthodox service: Not on file     Active member of club or organization: Not on file     Attends meetings of clubs or organizations: Not on file     Relationship status: Not on file   Other Topics Concern    Not on file   Social History Narrative    Senior year at [employer]     Family History   Problem Relation Age of Onset    Breast cancer Paternal Grandmother     Cancer Paternal Grandmother 65         breast    Headaches Mother     No Known Problems Father     Cancer Maternal Grandfather         passed age 74    No Known Problems Sister     No Known Problems Brother     Colon cancer Neg Hx     Ovarian cancer Neg Hx      OB History    Para Term  AB Living   1 0 0 0 0 0   SAB TAB Ectopic Multiple Live Births   0 0 0 0        # Outcome Date GA Lbr Mich/2nd Weight Sex Delivery Anes PTL Lv   1 Current                  Current Outpatient Medications   Medication Sig Dispense Refill    rizatriptan (MAXALT) 10 MG tablet Take 1 tablet (10 mg total) by mouth as needed for Migraine. 9 tablet 2    sumatriptan (IMITREX) 50 MG tablet Take 1 tablet (50 mg total) by mouth once. Take tablet at onset of Headache then every 2 hours that headache persists. for 1 dose 15 tablet 5     No current facility-administered medications for this visit.      Allergies: Patient has no known allergies.     ROS:  Constitutional: no weight loss, weight gain, fever, fatigue  Eyes:  No vision changes, glasses/contacts  ENT/Mouth: No ulcers, sinus problems, ears ringing, headache  Cardiovascular: No inability to lie flat, chest pain, exercise intolerance, swelling, heart palpitations  Respiratory: No wheezing, coughing blood, shortness of breath, or cough  Gastrointestinal: No diarrhea, bloody stool, nausea/vomiting, constipation, gas, hemorrhoids  Genitourinary: No blood in urine, painful urination, urgency of urination, frequency of urination, incomplete emptying, incontinence, abnormal bleeding, painful periods, heavy periods, vaginal discharge, vaginal odor, painful intercourse, sexual problems, bleeding after intercourse.  Musculoskeletal: No muscle weakness  Skin/Breast: No painful breasts, nipple discharge, masses, rash, ulcers  Neurological: No passing out, seizures, numbness, headache  Endocrine: No diabetes, hypothyroid, hyperthyroid, hot flashes, hair loss, abnormal hair growth, ance  Psychiatric: No  depression, crying  Hematologic: No bruises, bleeding, swollen lymph nodes, anemia.      OBJECTIVE:   The patient appears well, alert, oriented x 3, in no distress.  /68   Wt 66.6 kg (146 lb 12.8 oz)   LMP 11/08/2019 (Exact Date)   BMI 23.69 kg/m²   NECK: no thyromegaly, trachea midline  SKIN: no acne, striae, hirsutism  BREAST EXAM: not examined  ABDOMEN: no hernias, masses, or hepatosplenomegaly  GENITALIA: normal external genitalia, no erythema, no discharge  URETHRA: normal urethra, normal urethral meatus  VAGINA: Normal  CERVIX: no lesions or cervical motion tenderness  UTERUS: normal 6-8 wks  ADNEXA: normal adnexa      ASSESSMENT:   well woman  1. Amenorrhea    2. Well woman exam with routine gynecological exam    3. Positive urine pregnancy test        PLAN:   pap smear  additional lab tests per orders  return annually or prn  Orders Placed This Encounter    C. trachomatis/N. gonorrhoeae by AMP DNA [facility]; Vagina    Urine culture    Vaginosis Screen by DNA Probe    US OB <14 Wks, TransAbd, Single Gestation    Drug screen panel, emergency    POCT urine pregnancy    Liquid-Based Pap Smear, Screening

## 2020-01-10 ENCOUNTER — HOSPITAL ENCOUNTER (OUTPATIENT)
Dept: RADIOLOGY | Facility: HOSPITAL | Age: 20
Discharge: HOME | End: 2020-01-10
Attending: OBSTETRICS & GYNECOLOGY
Payer: COMMERCIAL

## 2020-01-10 DIAGNOSIS — N91.2 AMENORRHEA: ICD-10-CM

## 2020-01-10 LAB
AMPHET+METHAMPHET UR QL: NEGATIVE
BACTERIA UR CULT: NO GROWTH
BARBITURATES UR QL SCN>200 NG/ML: NEGATIVE
BENZODIAZ UR QL SCN>200 NG/ML: NEGATIVE
BZE UR QL SCN: NEGATIVE
CANNABINOIDS UR QL SCN: NEGATIVE
CREAT UR-MCNC: 15 MG/DL (ref 15–325)
METHADONE UR QL SCN>300 NG/ML: NEGATIVE
OPIATES UR QL SCN: NEGATIVE
PCP UR QL SCN>25 NG/ML: NEGATIVE
TOXICOLOGY INFORMATION: NORMAL

## 2020-01-10 PROCEDURE — 76801 OB US < 14 WKS SINGLE FETUS: CPT | Mod: TC,PO

## 2020-01-11 LAB
BACTERIAL VAGINOSIS DNA: NEGATIVE
C TRACH DNA SPEC QL NAA+PROBE: NOT DETECTED
CANDIDA GLABRATA DNA: NEGATIVE
CANDIDA KRUSEI DNA: NEGATIVE
CANDIDA RRNA VAG QL PROBE: NEGATIVE
N GONORRHOEA DNA SPEC QL NAA+PROBE: NOT DETECTED
T VAGINALIS RRNA GENITAL QL PROBE: NEGATIVE

## 2020-01-13 ENCOUNTER — TELEPHONE (OUTPATIENT)
Dept: OBSTETRICS AND GYNECOLOGY | Facility: CLINIC | Age: 20
End: 2020-01-13

## 2020-01-13 DIAGNOSIS — Z32.01 POSITIVE URINE PREGNANCY TEST: Primary | ICD-10-CM

## 2020-01-13 NOTE — TELEPHONE ENCOUNTER
Called patient, informed patient provider would like her to have a repeat ultrasound next week with a visit after. Ultrasound appointment scheduled for 1/22/2020 at 10:15am and patient informed to come to [facility] once she is done with ultrasound. Patient verbalized understanding.

## 2020-01-22 ENCOUNTER — ROUTINE PRENATAL (OUTPATIENT)
Dept: OBSTETRICS AND GYNECOLOGY | Facility: CLINIC | Age: 20
End: 2020-01-22
Payer: COMMERCIAL

## 2020-01-22 ENCOUNTER — HOSPITAL ENCOUNTER (OUTPATIENT)
Dept: RADIOLOGY | Facility: HOSPITAL | Age: 20
Discharge: HOME | End: 2020-01-22
Attending: OBSTETRICS & GYNECOLOGY
Payer: COMMERCIAL

## 2020-01-22 DIAGNOSIS — O30.041 DICHORIONIC DIAMNIOTIC TWIN PREGNANCY IN FIRST TRIMESTER: ICD-10-CM

## 2020-01-22 DIAGNOSIS — Z3A.01 7 WEEKS GESTATION OF PREGNANCY: Primary | ICD-10-CM

## 2020-01-22 DIAGNOSIS — Z32.01 POSITIVE URINE PREGNANCY TEST: ICD-10-CM

## 2020-01-22 PROCEDURE — 0502F SUBSEQUENT PRENATAL CARE: CPT | Mod: CPTII,S$GLB,, | Performed by: OBSTETRICS & GYNECOLOGY

## 2020-01-22 PROCEDURE — 99999 PR PBB SHADOW E&M-EST. PATIENT-LVL II: CPT | Mod: PBBFAC,,, | Performed by: OBSTETRICS & GYNECOLOGY

## 2020-01-22 PROCEDURE — 99999 PR PBB SHADOW E&M-EST. PATIENT-LVL II: ICD-10-PCS | Mod: PBBFAC,,, | Performed by: OBSTETRICS & GYNECOLOGY

## 2020-01-22 PROCEDURE — 0502F PR SUBSEQUENT PRENATAL CARE: ICD-10-PCS | Mod: CPTII,S$GLB,, | Performed by: OBSTETRICS & GYNECOLOGY

## 2020-01-22 PROCEDURE — 76801 OB US < 14 WKS SINGLE FETUS: CPT | Mod: TC,PO

## 2020-01-22 NOTE — PROGRESS NOTES
Doing well  Has a good apetite  U/s done Di Di twins at 7 /17 wks  rtc 4 wks    Discussed: Blood test ordered on first visit, Vitamins, folic acid, Wt Gain, Seafood and mercury, avoid deli food, unrefrigerated meats, cheeses and milk products, reason to avoid cat litter, the  accuracy of the STEF, the value of an early TVS, risks of each trimester,  Aneuploidy screening, OTC medication in the first trimester, harmful effects of Smoking and ETOH, AFP screen at 15 weeks and Anatomy +/- MFM US at 18-20 weeks.  Discussed: Common complaints of pregnancy, HIV and other routine prenatal tests, Tobacco abuse, risk factors identified by prenatal history, Anticipated course of prenatal care, Nutrition and weight gain counseling,Toxoplasmosis precautions (Cats/Raw Meat) Exercise, Alcohol, Illicit/Recreational Drugs, Seat belt use, Childbirth classes/Hospital facilities.The counseling session lasted approximately 25 minutes, and all her questions were answered.

## 2020-01-24 LAB
FINAL PATHOLOGIC DIAGNOSIS: NORMAL
Lab: NORMAL

## 2020-01-29 ENCOUNTER — PATIENT MESSAGE (OUTPATIENT)
Dept: OBSTETRICS AND GYNECOLOGY | Facility: CLINIC | Age: 20
End: 2020-01-29

## 2020-02-19 ENCOUNTER — ROUTINE PRENATAL (OUTPATIENT)
Dept: OBSTETRICS AND GYNECOLOGY | Facility: CLINIC | Age: 20
End: 2020-02-19
Payer: COMMERCIAL

## 2020-02-19 VITALS
WEIGHT: 144.38 LBS | SYSTOLIC BLOOD PRESSURE: 124 MMHG | BODY MASS INDEX: 23.31 KG/M2 | DIASTOLIC BLOOD PRESSURE: 66 MMHG

## 2020-02-19 DIAGNOSIS — Z3A.11 11 WEEKS GESTATION OF PREGNANCY: Primary | ICD-10-CM

## 2020-02-19 DIAGNOSIS — O30.041 DICHORIONIC DIAMNIOTIC TWIN PREGNANCY IN FIRST TRIMESTER: ICD-10-CM

## 2020-02-19 PROCEDURE — 0502F PR SUBSEQUENT PRENATAL CARE: ICD-10-PCS | Mod: CPTII,S$GLB,, | Performed by: OBSTETRICS & GYNECOLOGY

## 2020-02-19 PROCEDURE — 99999 PR PBB SHADOW E&M-EST. PATIENT-LVL II: ICD-10-PCS | Mod: PBBFAC,,, | Performed by: OBSTETRICS & GYNECOLOGY

## 2020-02-19 PROCEDURE — 0502F SUBSEQUENT PRENATAL CARE: CPT | Mod: CPTII,S$GLB,, | Performed by: OBSTETRICS & GYNECOLOGY

## 2020-02-19 PROCEDURE — 99999 PR PBB SHADOW E&M-EST. PATIENT-LVL II: CPT | Mod: PBBFAC,,, | Performed by: OBSTETRICS & GYNECOLOGY

## 2020-03-02 ENCOUNTER — PATIENT MESSAGE (OUTPATIENT)
Dept: OBSTETRICS AND GYNECOLOGY | Facility: CLINIC | Age: 20
End: 2020-03-02

## 2020-03-09 ENCOUNTER — PATIENT MESSAGE (OUTPATIENT)
Dept: OBSTETRICS AND GYNECOLOGY | Facility: CLINIC | Age: 20
End: 2020-03-09

## 2020-03-18 ENCOUNTER — ROUTINE PRENATAL (OUTPATIENT)
Dept: OBSTETRICS AND GYNECOLOGY | Facility: CLINIC | Age: 20
End: 2020-03-18
Payer: MEDICAID

## 2020-03-18 VITALS
WEIGHT: 150.63 LBS | DIASTOLIC BLOOD PRESSURE: 56 MMHG | SYSTOLIC BLOOD PRESSURE: 102 MMHG | BODY MASS INDEX: 24.31 KG/M2

## 2020-03-18 DIAGNOSIS — Z3A.15 15 WEEKS GESTATION OF PREGNANCY: ICD-10-CM

## 2020-03-18 DIAGNOSIS — O30.042 DICHORIONIC DIAMNIOTIC TWIN PREGNANCY IN SECOND TRIMESTER: Primary | ICD-10-CM

## 2020-03-18 DIAGNOSIS — Z3A.20 20 WEEKS GESTATION OF PREGNANCY: ICD-10-CM

## 2020-03-18 PROCEDURE — 99213 PR OFFICE/OUTPT VISIT, EST, LEVL III, 20-29 MIN: ICD-10-PCS | Mod: TH,S$PBB,, | Performed by: OBSTETRICS & GYNECOLOGY

## 2020-03-18 PROCEDURE — 99213 OFFICE O/P EST LOW 20 MIN: CPT | Mod: PBBFAC,TH,PN | Performed by: OBSTETRICS & GYNECOLOGY

## 2020-03-18 PROCEDURE — 99213 OFFICE O/P EST LOW 20 MIN: CPT | Mod: TH,S$PBB,, | Performed by: OBSTETRICS & GYNECOLOGY

## 2020-03-18 PROCEDURE — 99999 PR PBB SHADOW E&M-EST. PATIENT-LVL III: CPT | Mod: PBBFAC,,, | Performed by: OBSTETRICS & GYNECOLOGY

## 2020-03-18 PROCEDURE — 99999 PR PBB SHADOW E&M-EST. PATIENT-LVL III: ICD-10-PCS | Mod: PBBFAC,,, | Performed by: OBSTETRICS & GYNECOLOGY

## 2020-04-09 ENCOUNTER — PATIENT MESSAGE (OUTPATIENT)
Dept: OBSTETRICS AND GYNECOLOGY | Facility: CLINIC | Age: 20
End: 2020-04-09

## 2020-04-21 ENCOUNTER — PROCEDURE VISIT (OUTPATIENT)
Dept: OBSTETRICS AND GYNECOLOGY | Facility: CLINIC | Age: 20
End: 2020-04-21
Payer: MEDICAID

## 2020-04-21 ENCOUNTER — ROUTINE PRENATAL (OUTPATIENT)
Dept: OBSTETRICS AND GYNECOLOGY | Facility: CLINIC | Age: 20
End: 2020-04-21
Payer: MEDICAID

## 2020-04-21 VITALS — SYSTOLIC BLOOD PRESSURE: 104 MMHG | DIASTOLIC BLOOD PRESSURE: 64 MMHG

## 2020-04-21 DIAGNOSIS — O30.042 DICHORIONIC DIAMNIOTIC TWIN PREGNANCY IN SECOND TRIMESTER: ICD-10-CM

## 2020-04-21 DIAGNOSIS — Z3A.20 20 WEEKS GESTATION OF PREGNANCY: ICD-10-CM

## 2020-04-21 DIAGNOSIS — Z36.3 ANTENATAL SCREENING FOR MALFORMATION USING ULTRASONICS: ICD-10-CM

## 2020-04-21 DIAGNOSIS — O30.042 DICHORIONIC DIAMNIOTIC TWIN PREGNANCY IN SECOND TRIMESTER: Primary | ICD-10-CM

## 2020-04-21 PROCEDURE — 76812 OB US DETAILED ADDL FETUS: ICD-10-PCS | Mod: 26,S$PBB,, | Performed by: OBSTETRICS & GYNECOLOGY

## 2020-04-21 PROCEDURE — 99213 OFFICE O/P EST LOW 20 MIN: CPT | Mod: TH,S$PBB,, | Performed by: OBSTETRICS & GYNECOLOGY

## 2020-04-21 PROCEDURE — 76812 OB US DETAILED ADDL FETUS: CPT | Mod: PBBFAC,PO | Performed by: OBSTETRICS & GYNECOLOGY

## 2020-04-21 PROCEDURE — 76811 OB US DETAILED SNGL FETUS: CPT | Mod: PBBFAC,PO | Performed by: OBSTETRICS & GYNECOLOGY

## 2020-04-21 PROCEDURE — 76812 OB US DETAILED ADDL FETUS: CPT | Mod: 26,S$PBB,, | Performed by: OBSTETRICS & GYNECOLOGY

## 2020-04-21 PROCEDURE — 99499 UNLISTED E&M SERVICE: CPT | Mod: S$PBB,,, | Performed by: OBSTETRICS & GYNECOLOGY

## 2020-04-21 PROCEDURE — 76811 OB US DETAILED SNGL FETUS: CPT | Mod: 26,S$PBB,, | Performed by: OBSTETRICS & GYNECOLOGY

## 2020-04-21 PROCEDURE — 99999 PR PBB SHADOW E&M-EST. PATIENT-LVL II: CPT | Mod: PBBFAC,,, | Performed by: OBSTETRICS & GYNECOLOGY

## 2020-04-21 PROCEDURE — 99499 NO LOS: ICD-10-PCS | Mod: S$PBB,,, | Performed by: OBSTETRICS & GYNECOLOGY

## 2020-04-21 PROCEDURE — 99213 PR OFFICE/OUTPT VISIT, EST, LEVL III, 20-29 MIN: ICD-10-PCS | Mod: TH,S$PBB,, | Performed by: OBSTETRICS & GYNECOLOGY

## 2020-04-21 PROCEDURE — 99212 OFFICE O/P EST SF 10 MIN: CPT | Mod: PBBFAC,TH,PO | Performed by: OBSTETRICS & GYNECOLOGY

## 2020-04-21 PROCEDURE — 76811 PR US, OB FETAL EVAL & EXAM, TRANSABDOM,FIRST GESTATION: ICD-10-PCS | Mod: 26,S$PBB,, | Performed by: OBSTETRICS & GYNECOLOGY

## 2020-04-21 PROCEDURE — 99999 PR PBB SHADOW E&M-EST. PATIENT-LVL II: ICD-10-PCS | Mod: PBBFAC,,, | Performed by: OBSTETRICS & GYNECOLOGY

## 2020-05-07 ENCOUNTER — PATIENT MESSAGE (OUTPATIENT)
Dept: OBSTETRICS AND GYNECOLOGY | Facility: CLINIC | Age: 20
End: 2020-05-07

## 2020-05-14 ENCOUNTER — ROUTINE PRENATAL (OUTPATIENT)
Dept: OBSTETRICS AND GYNECOLOGY | Facility: CLINIC | Age: 20
End: 2020-05-14
Payer: MEDICAID

## 2020-05-14 VITALS
BODY MASS INDEX: 27.29 KG/M2 | DIASTOLIC BLOOD PRESSURE: 62 MMHG | SYSTOLIC BLOOD PRESSURE: 112 MMHG | WEIGHT: 169.06 LBS

## 2020-05-14 DIAGNOSIS — Z3A.23 23 WEEKS GESTATION OF PREGNANCY: Primary | ICD-10-CM

## 2020-05-14 PROCEDURE — 99999 PR PBB SHADOW E&M-EST. PATIENT-LVL II: ICD-10-PCS | Mod: PBBFAC,,, | Performed by: OBSTETRICS & GYNECOLOGY

## 2020-05-14 PROCEDURE — 99213 OFFICE O/P EST LOW 20 MIN: CPT | Mod: TH,S$PBB,, | Performed by: OBSTETRICS & GYNECOLOGY

## 2020-05-14 PROCEDURE — 99999 PR PBB SHADOW E&M-EST. PATIENT-LVL II: CPT | Mod: PBBFAC,,, | Performed by: OBSTETRICS & GYNECOLOGY

## 2020-05-14 PROCEDURE — 99213 PR OFFICE/OUTPT VISIT, EST, LEVL III, 20-29 MIN: ICD-10-PCS | Mod: TH,S$PBB,, | Performed by: OBSTETRICS & GYNECOLOGY

## 2020-05-14 PROCEDURE — 99212 OFFICE O/P EST SF 10 MIN: CPT | Mod: PBBFAC,PO | Performed by: OBSTETRICS & GYNECOLOGY

## 2020-05-14 NOTE — PROGRESS NOTES
Twins, having spont palpitations  fht's 140's x 2  Mat HR 90's  24 hr holter  rtc 4-5 wks for rhogam and dm screen

## 2020-05-15 ENCOUNTER — HOSPITAL ENCOUNTER (OUTPATIENT)
Dept: CARDIOLOGY | Facility: HOSPITAL | Age: 20
Discharge: HOME | End: 2020-05-15
Attending: OBSTETRICS & GYNECOLOGY
Payer: MEDICAID

## 2020-05-15 DIAGNOSIS — Z3A.23 23 WEEKS GESTATION OF PREGNANCY: ICD-10-CM

## 2020-05-15 PROCEDURE — 93227 HOLTER MONITOR - 24 HOUR (CUPID ONLY): ICD-10-PCS | Mod: ,,, | Performed by: INTERNAL MEDICINE

## 2020-05-15 PROCEDURE — 93226 XTRNL ECG REC<48 HR SCAN A/R: CPT | Mod: PO

## 2020-05-15 PROCEDURE — 93227 XTRNL ECG REC<48 HR R&I: CPT | Mod: ,,, | Performed by: INTERNAL MEDICINE

## 2020-05-22 LAB
OHS CV EVENT MONITOR DAY: 0
OHS CV HOLTER LENGTH DECIMAL HOURS: 24
OHS CV HOLTER LENGTH HOURS: 24
OHS CV HOLTER LENGTH MINUTES: 0

## 2020-06-13 ENCOUNTER — HOSPITAL ENCOUNTER (OUTPATIENT)
Facility: HOSPITAL | Age: 20
Discharge: HOME | End: 2020-06-14
Attending: OBSTETRICS & GYNECOLOGY | Admitting: OBSTETRICS & GYNECOLOGY
Payer: MEDICAID
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
## 2020-06-13 LAB
ABO + RH BLD: NORMAL
BASOPHILS # BLD AUTO: 0.02 K/UL (ref 0–0.2)
BASOPHILS # BLD AUTO: 0.03 K/UL (ref 0–0.2)
BASOPHILS NFR BLD: 0.2 % (ref 0–1.9)
BASOPHILS NFR BLD: 0.3 % (ref 0–1.9)
BLD GP AB SCN CELLS X3 SERPL QL: NORMAL
D DIMER PPP IA.FEU-MCNC: 1.09 MG/L FEU
DIFFERENTIAL METHOD: ABNORMAL
DIFFERENTIAL METHOD: ABNORMAL
EOSINOPHIL # BLD AUTO: 0 K/UL (ref 0–0.5)
EOSINOPHIL # BLD AUTO: 0.1 K/UL (ref 0–0.5)
EOSINOPHIL NFR BLD: 0.1 % (ref 0–8)
EOSINOPHIL NFR BLD: 0.6 % (ref 0–8)
ERYTHROCYTE [DISTWIDTH] IN BLOOD BY AUTOMATED COUNT: 13.6 % (ref 11.5–14.5)
ERYTHROCYTE [DISTWIDTH] IN BLOOD BY AUTOMATED COUNT: 13.6 % (ref 11.5–14.5)
FIBRINOGEN PPP-MCNC: 420 MG/DL (ref 182–366)
FIBRONECTIN FETAL SPEC QL: NEGATIVE
HCT VFR BLD AUTO: 36.3 % (ref 37–48.5)
HCT VFR BLD AUTO: 37.4 % (ref 37–48.5)
HGB BLD-MCNC: 12.2 G/DL (ref 12–16)
HGB BLD-MCNC: 12.5 G/DL (ref 12–16)
IMM GRANULOCYTES # BLD AUTO: 0.11 K/UL (ref 0–0.04)
IMM GRANULOCYTES # BLD AUTO: 0.13 K/UL (ref 0–0.04)
IMM GRANULOCYTES NFR BLD AUTO: 1 % (ref 0–0.5)
IMM GRANULOCYTES NFR BLD AUTO: 1.5 % (ref 0–0.5)
LYMPHOCYTES # BLD AUTO: 1.4 K/UL (ref 1–4.8)
LYMPHOCYTES # BLD AUTO: 1.7 K/UL (ref 1–4.8)
LYMPHOCYTES NFR BLD: 13.2 % (ref 18–48)
LYMPHOCYTES NFR BLD: 19.3 % (ref 18–48)
MCH RBC QN AUTO: 31.6 PG (ref 27–31)
MCH RBC QN AUTO: 31.9 PG (ref 27–31)
MCHC RBC AUTO-ENTMCNC: 33.4 G/DL (ref 32–36)
MCHC RBC AUTO-ENTMCNC: 33.6 G/DL (ref 32–36)
MCV RBC AUTO: 94 FL (ref 82–98)
MCV RBC AUTO: 95 FL (ref 82–98)
MONOCYTES # BLD AUTO: 0.6 K/UL (ref 0.3–1)
MONOCYTES # BLD AUTO: 0.7 K/UL (ref 0.3–1)
MONOCYTES NFR BLD: 5.4 % (ref 4–15)
MONOCYTES NFR BLD: 7.7 % (ref 4–15)
NEUTROPHILS # BLD AUTO: 6 K/UL (ref 1.8–7.7)
NEUTROPHILS # BLD AUTO: 8.5 K/UL (ref 1.8–7.7)
NEUTROPHILS NFR BLD: 70.7 % (ref 38–73)
NEUTROPHILS NFR BLD: 80 % (ref 38–73)
NRBC BLD-RTO: 0 /100 WBC
NRBC BLD-RTO: 0 /100 WBC
PLATELET # BLD AUTO: 163 K/UL (ref 150–350)
PLATELET # BLD AUTO: 163 K/UL (ref 150–350)
PMV BLD AUTO: 10 FL (ref 9.2–12.9)
PMV BLD AUTO: 10.1 FL (ref 9.2–12.9)
RBC # BLD AUTO: 3.82 M/UL (ref 4–5.4)
RBC # BLD AUTO: 3.96 M/UL (ref 4–5.4)
SARS-COV-2 RDRP RESP QL NAA+PROBE: NEGATIVE
WBC # BLD AUTO: 10.66 K/UL (ref 3.9–12.7)
WBC # BLD AUTO: 8.53 K/UL (ref 3.9–12.7)

## 2020-06-13 PROCEDURE — 85379 FIBRIN DEGRADATION QUANT: CPT

## 2020-06-13 PROCEDURE — 85460 HEMOGLOBIN FETAL: CPT

## 2020-06-13 PROCEDURE — 85060 BLOOD SMEAR INTERPRETATION: CPT | Mod: ,,, | Performed by: PATHOLOGY

## 2020-06-13 PROCEDURE — 85060 PATHOLOGIST INTERPRETATION KBT: ICD-10-PCS | Mod: ,,, | Performed by: PATHOLOGY

## 2020-06-13 PROCEDURE — 36415 COLL VENOUS BLD VENIPUNCTURE: CPT

## 2020-06-13 PROCEDURE — 85025 COMPLETE CBC W/AUTO DIFF WBC: CPT | Mod: 91

## 2020-06-13 PROCEDURE — 63600175 PHARM REV CODE 636 W HCPCS: Performed by: OBSTETRICS & GYNECOLOGY

## 2020-06-13 PROCEDURE — 82731 ASSAY OF FETAL FIBRONECTIN: CPT

## 2020-06-13 PROCEDURE — U0002 COVID-19 LAB TEST NON-CDC: HCPCS

## 2020-06-13 PROCEDURE — 86901 BLOOD TYPING SEROLOGIC RH(D): CPT

## 2020-06-13 PROCEDURE — 85384 FIBRINOGEN ACTIVITY: CPT

## 2020-06-13 RX ORDER — SODIUM CHLORIDE, SODIUM LACTATE, POTASSIUM CHLORIDE, CALCIUM CHLORIDE 600; 310; 30; 20 MG/100ML; MG/100ML; MG/100ML; MG/100ML
INJECTION, SOLUTION INTRAVENOUS CONTINUOUS
Status: DISCONTINUED | OUTPATIENT
Start: 2020-06-13 | End: 2020-06-14 | Stop reason: HOSPADM

## 2020-06-13 RX ADMIN — SODIUM CHLORIDE, SODIUM LACTATE, POTASSIUM CHLORIDE, AND CALCIUM CHLORIDE 1000 ML: .6; .31; .03; .02 INJECTION, SOLUTION INTRAVENOUS at 08:06

## 2020-06-13 RX ADMIN — SODIUM CHLORIDE, SODIUM LACTATE, POTASSIUM CHLORIDE, AND CALCIUM CHLORIDE: .6; .31; .03; .02 INJECTION, SOLUTION INTRAVENOUS at 09:06

## 2020-06-14 VITALS
HEIGHT: 66 IN | HEART RATE: 68 BPM | BODY MASS INDEX: 27.32 KG/M2 | TEMPERATURE: 98 F | OXYGEN SATURATION: 98 % | DIASTOLIC BLOOD PRESSURE: 64 MMHG | SYSTOLIC BLOOD PRESSURE: 113 MMHG | WEIGHT: 170 LBS

## 2020-06-14 LAB
FETAL RBC/1000 RBC BLD KLEIH BETKE-RTO: NORMAL
FIBRINOGEN PPP-MCNC: 420 MG/DL (ref 182–366)
INJECT RH IG VOL PATIENT: NORMAL ML

## 2020-06-14 PROCEDURE — 96361 HYDRATE IV INFUSION ADD-ON: CPT

## 2020-06-14 PROCEDURE — 63600519 RHOGAM PHARM REV CODE 636 ALT 250 W HCPCS: Performed by: OBSTETRICS & GYNECOLOGY

## 2020-06-14 PROCEDURE — 96372 THER/PROPH/DIAG INJ SC/IM: CPT | Mod: 59

## 2020-06-14 PROCEDURE — 99225 PR SUBSEQUENT OBSERVATION CARE,LEVEL II: CPT | Mod: ,,, | Performed by: OBSTETRICS & GYNECOLOGY

## 2020-06-14 PROCEDURE — 96360 HYDRATION IV INFUSION INIT: CPT

## 2020-06-14 PROCEDURE — 99201 *HC E&M-NEW PATIENT-LVL I: CPT | Mod: 25

## 2020-06-14 PROCEDURE — 25000003 PHARM REV CODE 250: Performed by: OBSTETRICS & GYNECOLOGY

## 2020-06-14 PROCEDURE — 99225 PR SUBSEQUENT OBSERVATION CARE,LEVEL II: ICD-10-PCS | Mod: ,,, | Performed by: OBSTETRICS & GYNECOLOGY

## 2020-06-14 RX ORDER — DIPHENHYDRAMINE HCL 25 MG
25 CAPSULE ORAL ONCE
Status: COMPLETED | OUTPATIENT
Start: 2020-06-14 | End: 2020-06-14

## 2020-06-14 RX ORDER — SODIUM CHLORIDE 9 MG/ML
INJECTION, SOLUTION INTRAVENOUS CONTINUOUS
Status: DISCONTINUED | OUTPATIENT
Start: 2020-06-14 | End: 2020-06-14 | Stop reason: HOSPADM

## 2020-06-14 RX ADMIN — DIPHENHYDRAMINE HYDROCHLORIDE 25 MG: 25 CAPSULE ORAL at 01:06

## 2020-06-14 RX ADMIN — HUMAN RHO(D) IMMUNE GLOBULIN 300 MCG: 300 INJECTION, SOLUTION INTRAMUSCULAR at 10:06

## 2020-06-14 NOTE — PLAN OF CARE
Dr mendoza at bedside to see pt. Will discharge pt home. Pt to received rhogam before she leaves. Order released. Pt to follow up with dr mandujano on Tuesday for regularly scheduled prenatal appt.

## 2020-06-14 NOTE — DISCHARGE SUMMARY
Admit Date: 6/13/20  Discharge Date: 6/14/20     Attending physician: MD Peter     Diagnosis:  IUP 27 weeks  Abdominal trauma  Twin pregnancy  Asthma  Chronic headaches  RH negative     Procedure:   Prolonged monitoring     Hospital Course: Afebrile and vital signs stable throughout hospital course. Routine progressive care. Vaginal bleeding stable. Tolerating oral intake. No pain     Discharged Condition: Improving     Disposition: Discharged to home or self care     Activity:  As tolerated  Diet: Regular  Medications: Given to patient or sent electronically   Follow up:  Tuesday as scheduled

## 2020-06-14 NOTE — DISCHARGE INSTRUCTIONS
Discharge home in stable condition. Follow up with Dr. Wood for next scheduled appointment on 6/16/2020.

## 2020-06-14 NOTE — NURSING
1902: G1 at 27 weeks and 4 days with twins arrived to L&D triage stating that she fell on her abdomen about an hour ago. No c/o vaginal bleeding or abdominal pain at this time. Upon assessment, pt became lightheaded & pale. BP 86/51. Cool towel applied to pt, juice given, and pt sat up in bed. Pt symptoms began to improve. EFM applied. On call MD to be notified of pt arrival.     1943: Dr. Arevalo (on call MD) called and notified of pt. MD made aware of pt c/o & symptoms. Baby A able to trace on EFM, baby B intermittent tracing. Ctx palpated and noted on EFM. Pt to stay overnight for prolonged monitoring. IV to be inserted; LR bolus to be given followed by 125ml/hr. CBC, T&S, KB, fibrinogen, d-dimer, and U/S w/ BPP to be collected. FFN and SVE also to be performed. Pt to remain NPO. Will continue to monitor and update pt on POC.     2015: Dr. Arevalo made aware of pt Rh status. Orders placed for RhoGam work up. Pt to receive RhoGam after KB results.

## 2020-06-14 NOTE — H&P
"HPI:   Mar Luna is a 19 y.o. female  at 27 weeks 5 days EGA who presents here for abdominal trauma after falling and hitting the left side of her abdomen. She denies vaginal bleeding. Her prenatal care was complicated by twin pregnancy, asthma, RH negative, and chronic headaches. Her ultrasound showed both twins had BPP that was normal. Her preliminary KB was negative.    ROS:  GENERAL: Denies weight gain or weight loss. Feeling well overall.   SKIN: Denies rash or lesions.   HEAD: Denies head injury or headache.   CHEST: Denies chest pain or shortness of breath.   CARDIOVASCULAR: Denies palpitations or left sided chest pain.   ABDOMEN: No constipation, diarrhea, nausea, vomiting or rectal bleeding.   URINARY: No frequency, dysuria, hematuria, or burning on urination.  REPRODUCTIVE: See HPI.     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    diagnosed by psychiatry    Asthma     mostly exercise-induced now    Dyslexia     Headache, tension-type     Worsening headaches 2016    followed by neurology     Past Surgical History:   Procedure Laterality Date    TONSILLECTOMY  0 2017     Family History   Problem Relation Age of Onset    Breast cancer Paternal Grandmother     Cancer Paternal Grandmother 65        breast    Headaches Mother     No Known Problems Father     Cancer Maternal Grandfather         passed age 74    No Known Problems Sister     No Known Problems Brother     Colon cancer Neg Hx     Ovarian cancer Neg Hx      Patient has no known allergies.       PE:   /64   Pulse 68   Temp 98.4 °F (36.9 °C) (Oral)   Ht 5' 6" (1.676 m)   Wt 77.1 kg (170 lb)   LMP 2019 (LMP Unknown)   SpO2 98%   Breastfeeding No   BMI 27.44 kg/m²   APPEARANCE: Well nourished, well developed, in no acute distress.  CHEST: Lungs clear to auscultation.  HEART: Regular rate and rhythm, no murmurs, rubs or gallops.  ABDOMEN:  Gravid. NTND soft "   SVE: Closed    Assessment:  IUP 27 weeks 5 days  Twins  Abdominal trauma  Category 1 Fetal Heart Tracing    Plan:   Patient to call Dr. Green's office to be seen this week and needs to follow up with final result of KB  Bleeding, abdominal pain and FM precautions given    Rhogam prior to discharge since she was going to get Rhogam on Tuesday

## 2020-06-15 LAB — PATHOLOGIST INTERPRETATION KBT: NORMAL

## 2020-06-16 ENCOUNTER — LAB VISIT (OUTPATIENT)
Dept: LAB | Facility: HOSPITAL | Age: 20
End: 2020-06-16
Attending: OBSTETRICS & GYNECOLOGY
Payer: MEDICAID

## 2020-06-16 ENCOUNTER — ROUTINE PRENATAL (OUTPATIENT)
Dept: OBSTETRICS AND GYNECOLOGY | Facility: CLINIC | Age: 20
End: 2020-06-16
Payer: MEDICAID

## 2020-06-16 VITALS
WEIGHT: 178.13 LBS | DIASTOLIC BLOOD PRESSURE: 68 MMHG | SYSTOLIC BLOOD PRESSURE: 116 MMHG | BODY MASS INDEX: 28.75 KG/M2

## 2020-06-16 DIAGNOSIS — Z3A.28 28 WEEKS GESTATION OF PREGNANCY: ICD-10-CM

## 2020-06-16 DIAGNOSIS — O30.042 DICHORIONIC DIAMNIOTIC TWIN PREGNANCY IN SECOND TRIMESTER: ICD-10-CM

## 2020-06-16 DIAGNOSIS — Z3A.28 28 WEEKS GESTATION OF PREGNANCY: Primary | ICD-10-CM

## 2020-06-16 LAB — GLUCOSE SERPL-MCNC: 159 MG/DL (ref 70–140)

## 2020-06-16 PROCEDURE — 99999 PR PBB SHADOW E&M-EST. PATIENT-LVL II: ICD-10-PCS | Mod: PBBFAC,,, | Performed by: OBSTETRICS & GYNECOLOGY

## 2020-06-16 PROCEDURE — 99213 OFFICE O/P EST LOW 20 MIN: CPT | Mod: S$PBB,TH,, | Performed by: OBSTETRICS & GYNECOLOGY

## 2020-06-16 PROCEDURE — 82950 GLUCOSE TEST: CPT

## 2020-06-16 PROCEDURE — 99213 PR OFFICE/OUTPT VISIT, EST, LEVL III, 20-29 MIN: ICD-10-PCS | Mod: S$PBB,TH,, | Performed by: OBSTETRICS & GYNECOLOGY

## 2020-06-16 PROCEDURE — 99999 PR PBB SHADOW E&M-EST. PATIENT-LVL II: CPT | Mod: PBBFAC,,, | Performed by: OBSTETRICS & GYNECOLOGY

## 2020-06-16 PROCEDURE — 99212 OFFICE O/P EST SF 10 MIN: CPT | Mod: PBBFAC,TH,PO | Performed by: OBSTETRICS & GYNECOLOGY

## 2020-06-16 PROCEDURE — 36415 COLL VENOUS BLD VENIPUNCTURE: CPT

## 2020-06-16 NOTE — PROGRESS NOTES
FHts 140s to baby A and B   Doing well   HA resolved   Seen in the ED on 6/1 after fall U/s performed that was normal. Endorses good fetal movement since fall   S/p rhogam on 6/14  Follow up OB glucose screen    RTC in 4 weeks.

## 2020-06-18 ENCOUNTER — TELEPHONE (OUTPATIENT)
Dept: OBSTETRICS AND GYNECOLOGY | Facility: CLINIC | Age: 20
End: 2020-06-18

## 2020-06-18 ENCOUNTER — TELEPHONE (OUTPATIENT)
Dept: OBSTETRICS AND GYNECOLOGY | Facility: HOSPITAL | Age: 20
End: 2020-06-18

## 2020-06-18 DIAGNOSIS — R68.89 ABNORMALITY ON SCREENING TEST: Primary | ICD-10-CM

## 2020-06-18 NOTE — TELEPHONE ENCOUNTER
Called patient, informed patient of provider's message. Patient verbalized understanding. Patient stated she has an appointment scheduled for 7/15/20 and would just like to come at that time rather then come to two different appointments if possible. Advised patient that would be fine.

## 2020-06-18 NOTE — TELEPHONE ENCOUNTER
Called in glucometer, test stirps, and lancets which every patient's insurance cover . Patient to check blood sugar fasting in the AM and 2 hours after each meal. Patient testing 4-6 times daily. With 6 refills, per protocal.

## 2020-06-18 NOTE — TELEPHONE ENCOUNTER
Called patient, no answer, left message to call back. I was calling to inform patient she needs to do a 3 hour GTT.

## 2020-06-18 NOTE — TELEPHONE ENCOUNTER
Called patient, patient stated she was returning my call. Informed patient I was calling to inform her provider stated she needs to do 3hr GTT. Patient stated can she refuse the test because she can not do the test. Informed patient I would send a message to the provider.

## 2020-06-18 NOTE — TELEPHONE ENCOUNTER
Then we will treat her like a gest DM     She should start a low carb diet, avoid high carb foods such as juices, bread, pasta, rice, tortillas etc and return in 1-2 wks for further testing.  plz send in rx for Glucometer,teststrips, lancets, alcohol swabs etc to be used fasting in am and 2hr post meals. Log all #'s and bring log to all visits

## 2020-06-18 NOTE — TELEPHONE ENCOUNTER
----- Message from Martina Arteaga sent at 6/18/2020 10:57 AM CDT -----  Regarding: returned call  Type:  Patient Returning Call    Who Called: Patient  Who Left Message for Patient: Shante  Does the patient know what this is regarding?: results  Would the patient rather a call back or a response via [facility]? Call back  Best Call Back Number: [phone]  Additional Information: n/a

## 2020-06-22 ENCOUNTER — TELEPHONE (OUTPATIENT)
Dept: OBSTETRICS AND GYNECOLOGY | Facility: CLINIC | Age: 20
End: 2020-06-22

## 2020-06-22 NOTE — TELEPHONE ENCOUNTER
Called patient, patient stated she was calling to see how to read the blood sugars on her meter. Patient stated she started checking her blood sugars on 6/19/20 and was told to keep a log and was unsure how to use the machine. Advised patient to read the directions that came with the machine and if she has any questionable readings to do the controls on the meter. Advised patient to record all the reading she gets. Patient verbalized understanding.

## 2020-06-22 NOTE — TELEPHONE ENCOUNTER
----- Message from Arnulfo Mcnally sent at 6/22/2020 12:05 PM CDT -----  Contact: patient  [phone]  Patient returning your call   Please advise

## 2020-07-15 ENCOUNTER — ROUTINE PRENATAL (OUTPATIENT)
Dept: OBSTETRICS AND GYNECOLOGY | Facility: CLINIC | Age: 20
End: 2020-07-15
Payer: MEDICAID

## 2020-07-15 VITALS — BODY MASS INDEX: 28.99 KG/M2 | WEIGHT: 179.63 LBS

## 2020-07-15 DIAGNOSIS — Z3A.32 32 WEEKS GESTATION OF PREGNANCY: Primary | ICD-10-CM

## 2020-07-15 DIAGNOSIS — O30.043 DICHORIONIC DIAMNIOTIC TWIN PREGNANCY IN THIRD TRIMESTER: ICD-10-CM

## 2020-07-15 PROCEDURE — 99999 PR PBB SHADOW E&M-EST. PATIENT-LVL II: CPT | Mod: PBBFAC,,, | Performed by: OBSTETRICS & GYNECOLOGY

## 2020-07-15 PROCEDURE — 99213 OFFICE O/P EST LOW 20 MIN: CPT | Mod: TH,S$PBB,, | Performed by: OBSTETRICS & GYNECOLOGY

## 2020-07-15 PROCEDURE — 99999 PR PBB SHADOW E&M-EST. PATIENT-LVL II: ICD-10-PCS | Mod: PBBFAC,,, | Performed by: OBSTETRICS & GYNECOLOGY

## 2020-07-15 PROCEDURE — 99213 PR OFFICE/OUTPT VISIT, EST, LEVL III, 20-29 MIN: ICD-10-PCS | Mod: TH,S$PBB,, | Performed by: OBSTETRICS & GYNECOLOGY

## 2020-07-15 PROCEDURE — 99212 OFFICE O/P EST SF 10 MIN: CPT | Mod: PBBFAC,TH,PN | Performed by: OBSTETRICS & GYNECOLOGY

## 2020-07-15 NOTE — PROGRESS NOTES
Doing well  No palpitations  fht's 130-40's x 2  rtc 3 wks  Rpt u/s at36- 37 wks, discuss IOL at 38 wks

## 2020-07-20 ENCOUNTER — TELEPHONE (OUTPATIENT)
Dept: OBSTETRICS AND GYNECOLOGY | Facility: CLINIC | Age: 20
End: 2020-07-20

## 2020-07-20 NOTE — TELEPHONE ENCOUNTER
Marlen  Ask pt to send us her BS log  I forgot to get it at her last visit to put it into her chart  Also let her know delivery will be at 38 wks not 39

## 2020-07-20 NOTE — TELEPHONE ENCOUNTER
----- Message from Winifred Young sent at 7/20/2020  2:02 PM CDT -----  Type:  Needs Medical Advice    Who Called: self  Reason:Was told to get in contact with carole   Would the patient rather a call back or a response via [facility]? callback  Best Call Back Number: [phone]  Additional Information: none

## 2020-07-20 NOTE — TELEPHONE ENCOUNTER
Called patient, informed patient of provider's message about blood sugar log and IOL will be at 38 weeks not 39 weeks. Patient verbalized understanding and stated she would send blood sugar log through the patient portal.

## 2020-07-22 ENCOUNTER — ROUTINE PRENATAL (OUTPATIENT)
Dept: OBSTETRICS AND GYNECOLOGY | Facility: CLINIC | Age: 20
End: 2020-07-22
Payer: MEDICAID

## 2020-07-22 VITALS
SYSTOLIC BLOOD PRESSURE: 122 MMHG | DIASTOLIC BLOOD PRESSURE: 60 MMHG | BODY MASS INDEX: 28.79 KG/M2 | WEIGHT: 178.38 LBS

## 2020-07-22 DIAGNOSIS — Z3A.33 33 WEEKS GESTATION OF PREGNANCY: Primary | ICD-10-CM

## 2020-07-22 PROCEDURE — 99999 PR PBB SHADOW E&M-EST. PATIENT-LVL II: ICD-10-PCS | Mod: PBBFAC,,, | Performed by: OBSTETRICS & GYNECOLOGY

## 2020-07-22 PROCEDURE — 99213 OFFICE O/P EST LOW 20 MIN: CPT | Mod: TH,S$PBB,, | Performed by: OBSTETRICS & GYNECOLOGY

## 2020-07-22 PROCEDURE — 99212 OFFICE O/P EST SF 10 MIN: CPT | Mod: PBBFAC,PN | Performed by: OBSTETRICS & GYNECOLOGY

## 2020-07-22 PROCEDURE — 99999 PR PBB SHADOW E&M-EST. PATIENT-LVL II: CPT | Mod: PBBFAC,,, | Performed by: OBSTETRICS & GYNECOLOGY

## 2020-07-22 PROCEDURE — 99213 PR OFFICE/OUTPT VISIT, EST, LEVL III, 20-29 MIN: ICD-10-PCS | Mod: TH,S$PBB,, | Performed by: OBSTETRICS & GYNECOLOGY

## 2020-07-22 NOTE — PROGRESS NOTES
abd ominal wall swelling from tight elastic waist shorts  fht's 130-50's x 2  Loose clothing  abd binder prn  Cont diet andf accuchecks

## 2020-07-31 ENCOUNTER — ROUTINE PRENATAL (OUTPATIENT)
Dept: OBSTETRICS AND GYNECOLOGY | Facility: CLINIC | Age: 20
End: 2020-07-31
Payer: MEDICAID

## 2020-07-31 VITALS — WEIGHT: 183 LBS | BODY MASS INDEX: 29.54 KG/M2 | DIASTOLIC BLOOD PRESSURE: 60 MMHG | SYSTOLIC BLOOD PRESSURE: 110 MMHG

## 2020-07-31 DIAGNOSIS — O30.043 DICHORIONIC DIAMNIOTIC TWIN PREGNANCY IN THIRD TRIMESTER: Primary | ICD-10-CM

## 2020-07-31 PROCEDURE — 99999 PR PBB SHADOW E&M-EST. PATIENT-LVL II: CPT | Mod: PBBFAC,,, | Performed by: OBSTETRICS & GYNECOLOGY

## 2020-07-31 PROCEDURE — 99212 OFFICE O/P EST SF 10 MIN: CPT | Mod: PBBFAC,TH,PN | Performed by: OBSTETRICS & GYNECOLOGY

## 2020-07-31 PROCEDURE — 99999 PR PBB SHADOW E&M-EST. PATIENT-LVL II: ICD-10-PCS | Mod: PBBFAC,,, | Performed by: OBSTETRICS & GYNECOLOGY

## 2020-07-31 PROCEDURE — 99213 OFFICE O/P EST LOW 20 MIN: CPT | Mod: S$PBB,TH,, | Performed by: OBSTETRICS & GYNECOLOGY

## 2020-07-31 PROCEDURE — 99213 PR OFFICE/OUTPT VISIT, EST, LEVL III, 20-29 MIN: ICD-10-PCS | Mod: S$PBB,TH,, | Performed by: OBSTETRICS & GYNECOLOGY

## 2020-07-31 NOTE — PROGRESS NOTES
Pelvic pressire RLP and sciatica  fht's 120-30 and 130-40's  cx closed 50 post  Head low at -1 station  abd binder, limit activities that worsen sx's

## 2020-08-05 ENCOUNTER — TELEPHONE (OUTPATIENT)
Dept: OBSTETRICS AND GYNECOLOGY | Facility: CLINIC | Age: 20
End: 2020-08-05

## 2020-08-05 ENCOUNTER — ROUTINE PRENATAL (OUTPATIENT)
Dept: OBSTETRICS AND GYNECOLOGY | Facility: CLINIC | Age: 20
End: 2020-08-05
Payer: MEDICAID

## 2020-08-05 VITALS
BODY MASS INDEX: 29.96 KG/M2 | DIASTOLIC BLOOD PRESSURE: 70 MMHG | WEIGHT: 185.63 LBS | SYSTOLIC BLOOD PRESSURE: 120 MMHG

## 2020-08-05 DIAGNOSIS — Z3A.36 36 WEEKS GESTATION OF PREGNANCY: ICD-10-CM

## 2020-08-05 DIAGNOSIS — Z3A.35 35 WEEKS GESTATION OF PREGNANCY: Primary | ICD-10-CM

## 2020-08-05 PROCEDURE — 99213 OFFICE O/P EST LOW 20 MIN: CPT | Mod: TH,S$PBB,, | Performed by: OBSTETRICS & GYNECOLOGY

## 2020-08-05 PROCEDURE — 99213 PR OFFICE/OUTPT VISIT, EST, LEVL III, 20-29 MIN: ICD-10-PCS | Mod: TH,S$PBB,, | Performed by: OBSTETRICS & GYNECOLOGY

## 2020-08-05 PROCEDURE — 87081 CULTURE SCREEN ONLY: CPT

## 2020-08-05 PROCEDURE — 99999 PR PBB SHADOW E&M-EST. PATIENT-LVL III: CPT | Mod: PBBFAC,,, | Performed by: OBSTETRICS & GYNECOLOGY

## 2020-08-05 PROCEDURE — 99999 PR PBB SHADOW E&M-EST. PATIENT-LVL III: ICD-10-PCS | Mod: PBBFAC,,, | Performed by: OBSTETRICS & GYNECOLOGY

## 2020-08-05 PROCEDURE — 99213 OFFICE O/P EST LOW 20 MIN: CPT | Mod: PBBFAC,PN | Performed by: OBSTETRICS & GYNECOLOGY

## 2020-08-05 NOTE — TELEPHONE ENCOUNTER
Patient to have  (twins) on 20. Dr. Wood wants you to put in between the 2 surgery cases already scheduled.

## 2020-08-05 NOTE — PROGRESS NOTES
Discussed delivery options, Baldo Alexandre  No log book but says #'s in 100's pp and 90's fasting  Refuses SUKHI wants c/s  Scheduled at 8/25 at 38 wks

## 2020-08-11 ENCOUNTER — HOSPITAL ENCOUNTER (OUTPATIENT)
Facility: HOSPITAL | Age: 20
Discharge: HOME | End: 2020-08-12
Attending: OBSTETRICS & GYNECOLOGY | Admitting: OBSTETRICS & GYNECOLOGY
Payer: MEDICAID

## 2020-08-11 DIAGNOSIS — O42.90 AMNIOTIC FLUID LEAKING: ICD-10-CM

## 2020-08-11 LAB
BILIRUB UR QL STRIP: NEGATIVE
CLARITY UR: CLEAR
COLOR UR: YELLOW
GLUCOSE UR QL STRIP: NEGATIVE
HGB UR QL STRIP: ABNORMAL
KETONES UR QL STRIP: NEGATIVE
LEUKOCYTE ESTERASE UR QL STRIP: NEGATIVE
NITRITE UR QL STRIP: NEGATIVE
PH UR STRIP: 7 [PH] (ref 5–8)
PROT UR QL STRIP: NEGATIVE
RUPTURE OF MEMBRANE: NEGATIVE
SARS-COV-2 RDRP RESP QL NAA+PROBE: NEGATIVE
SP GR UR STRIP: 1.01 (ref 1–1.03)
URN SPEC COLLECT METH UR: ABNORMAL
UROBILINOGEN UR STRIP-ACNC: NEGATIVE EU/DL

## 2020-08-11 PROCEDURE — 96360 HYDRATION IV INFUSION INIT: CPT

## 2020-08-11 PROCEDURE — 25000003 PHARM REV CODE 250: Performed by: OBSTETRICS & GYNECOLOGY

## 2020-08-11 PROCEDURE — 96361 HYDRATE IV INFUSION ADD-ON: CPT

## 2020-08-11 PROCEDURE — U0002 COVID-19 LAB TEST NON-CDC: HCPCS

## 2020-08-11 PROCEDURE — 63600175 PHARM REV CODE 636 W HCPCS: Performed by: OBSTETRICS & GYNECOLOGY

## 2020-08-11 PROCEDURE — 84112 EVAL AMNIOTIC FLUID PROTEIN: CPT

## 2020-08-11 PROCEDURE — 81003 URINALYSIS AUTO W/O SCOPE: CPT

## 2020-08-11 PROCEDURE — 99211 OFF/OP EST MAY X REQ PHY/QHP: CPT | Mod: 25

## 2020-08-11 RX ORDER — ACETAMINOPHEN 325 MG/1
650 TABLET ORAL EVERY 6 HOURS PRN
Status: DISCONTINUED | OUTPATIENT
Start: 2020-08-11 | End: 2020-08-12 | Stop reason: HOSPADM

## 2020-08-11 RX ORDER — SODIUM CHLORIDE, SODIUM LACTATE, POTASSIUM CHLORIDE, CALCIUM CHLORIDE 600; 310; 30; 20 MG/100ML; MG/100ML; MG/100ML; MG/100ML
INJECTION, SOLUTION INTRAVENOUS CONTINUOUS
Status: DISCONTINUED | OUTPATIENT
Start: 2020-08-11 | End: 2020-08-12 | Stop reason: HOSPADM

## 2020-08-11 RX ORDER — DIPHENHYDRAMINE HCL 25 MG
25 CAPSULE ORAL ONCE
Status: COMPLETED | OUTPATIENT
Start: 2020-08-11 | End: 2020-08-11

## 2020-08-11 RX ADMIN — SODIUM CHLORIDE, SODIUM LACTATE, POTASSIUM CHLORIDE, AND CALCIUM CHLORIDE 500 ML: .6; .31; .03; .02 INJECTION, SOLUTION INTRAVENOUS at 08:08

## 2020-08-11 RX ADMIN — SODIUM CHLORIDE, SODIUM LACTATE, POTASSIUM CHLORIDE, AND CALCIUM CHLORIDE: .6; .31; .03; .02 INJECTION, SOLUTION INTRAVENOUS at 11:08

## 2020-08-11 RX ADMIN — ACETAMINOPHEN 650 MG: 325 TABLET ORAL at 10:08

## 2020-08-11 RX ADMIN — DIPHENHYDRAMINE HYDROCHLORIDE 25 MG: 25 CAPSULE ORAL at 11:08

## 2020-08-12 VITALS
RESPIRATION RATE: 18 BRPM | DIASTOLIC BLOOD PRESSURE: 62 MMHG | TEMPERATURE: 98 F | HEART RATE: 94 BPM | SYSTOLIC BLOOD PRESSURE: 111 MMHG | OXYGEN SATURATION: 97 %

## 2020-08-12 PROCEDURE — 96361 HYDRATE IV INFUSION ADD-ON: CPT

## 2020-08-12 NOTE — NURSING
0730 per Dr Green check patient's cervix.  If she is still only a finger tip, and if she is not having contractions regularly the she can go home.  He wants her to see him in the office in [place] on this Thursday August 13, at 1400.    0745 Dr Green advised the patient's cervix is still only a fingertip. Her contractions are irregular. I will d/c the patient home.

## 2020-08-12 NOTE — NURSING
"Received report from Lisa MENENDEZ. 20 year old G 1 P 0 at 36 wks with twins with c/o ROM. AM nurse sent ROM+, results came back negative.  History/complications of tension H/A's, asthma ("mostly exercise induced now"), dyslexia, tonsillectomy, and adjustment disorder of adolescence. Denies vaginal bleeding, states noticed leaking fluid at 11AM 8/11/2020. Fetus: baby A heart tones 140bpm, baby B heart tones 135 bpm, + fetal movements. Contractions q1-2 mins. Abdomen soft/nontender. Vital signs stable. Dr. Green at bedside performing SVE. MD stated pt is a fingertip, posterior to the left and thick. MD stated will watch pt overnight to see if cervical change is made, start IV and administer 500 cc LR bolus and then run at 150 ml/hr, send U/A, and order an ultrasound on pt. Reviewed plan of care with pt, and educated on electronic fetal monitoring and assessments. Questions answered.   "

## 2020-08-12 NOTE — H&P
HISTORY AND PHYSICAL  ANTEPARTUM          Subjective:       Mar Luna is a 20 y.o.  female with Di/DI Twin  IUP at 36w1d measured by LMP consistent with ultrasound with significant hx of leaking who is being admitted for r/o PTL.   Patient denies contractions, denies vaginal bleeding, reports LOF.   Fetal Movement: normal.     PMHx:   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    diagnosed by psychiatry    Asthma     mostly exercise-induced now    Dyslexia     Headache, tension-type     Worsening headaches 2016    followed by neurology       PSHx:   Past Surgical History:   Procedure Laterality Date    TONSILLECTOMY  0 2017       All: Review of patient's allergies indicates:  No Known Allergies    Meds:   No medications prior to admission.       SH:   Social History     Socioeconomic History    Marital status: Single     Spouse name: Not on file    Number of children: Not on file    Years of education: Not on file    Highest education level: Not on file   Occupational History    Not on file   Social Needs    Financial resource strain: Not on file    Food insecurity     Worry: Not on file     Inability: Not on file    Transportation needs     Medical: Not on file     Non-medical: Not on file   Tobacco Use    Smoking status: Never Smoker    Smokeless tobacco: Never Used   Substance and Sexual Activity    Alcohol use: No    Drug use: No    Sexual activity: Yes     Partners: Male     Birth control/protection: OCP     Comment: Mother not aware   Lifestyle    Physical activity     Days per week: Not on file     Minutes per session: Not on file    Stress: Not on file   Relationships    Social connections     Talks on phone: Not on file     Gets together: Not on file     Attends [employer] service: Not on file     Active member of club or organization: Not on file     Attends meetings of clubs or organizations: Not on file      Relationship status: Not on file   Other Topics Concern    Not on file   Social History Narrative    Senior year at [employer]       FH:   Family History   Problem Relation Age of Onset    Breast cancer Paternal Grandmother     Cancer Paternal Grandmother 65        breast    Headaches Mother     No Known Problems Father     Cancer Maternal Grandfather         passed age 74    No Known Problems Sister     No Known Problems Brother     Colon cancer Neg Hx     Ovarian cancer Neg Hx        OBHx:   OB History    Para Term  AB Living   1 0 0 0 0 0   SAB TAB Ectopic Multiple Live Births   0 0 0 0 0      # Outcome Date GA Lbr Mich/2nd Weight Sex Delivery Anes PTL Lv   1 Current                Objective:       /62   Pulse 94   Temp 98.3 °F (36.8 °C) (Oral)   Resp 18   LMP 2019 (LMP Unknown)   SpO2 97%   Breastfeeding No     Vitals:    20 0509 20 0518 20 0522 20 0523   BP:       BP Location:       Patient Position:       Pulse: 94 94 94 94   Resp:    18   Temp:    98.3 °F (36.8 °C)   TempSrc:    Oral   SpO2: 98% 99%  97%       General:   alert, appears stated age and cooperative   Lungs:   clear to auscultation bilaterally   Heart:   regular rate and rhythm   Abdomen:  soft, non-tender; bowel sounds normal; no masses,  no organomegaly   Extremities negative edema, negative erythema   FHT: 130-40's x 2 Cat 1 (reassuring)                 TOCO: Q q1-5 minutes   Presentations: cephalic /Cephalic by U/s   Cervix:     Dilation: 1cm    Effacement: 50%    Station:  -1    Consistency: medium    Position: posterior     Lab Review  Blood Type O NEG  GBBS: negative  Rubella: Immune  RPR: nr  HIV: negative  HepB: negative    ROM plus was -  Assessment:       36w1d weeks gestation presents for R/o ROM, and PTctx's, urinary incontinence    Patient Active Problem List   Diagnosis    Adjustment disorder of adolescence    Asthma    Chronic migraine    Pre-conception  counseling    Analgesic rebound headache    Fall    Dichorionic diamniotic twin pregnancy in third trimester    Amniotic fluid leaking          Plan:      Risks, benefits, alternatives and possible complications have been discussed in detail with the patient.   - Consents signed and to chart  - Admit to  Antefloor, if no cervical change will d/c to home  IVF

## 2020-08-12 NOTE — NURSING
Patient advised to see Dr Green Thursday in [place] at 2:00 PM.  Patient also advised to follow Labor Precautions:  Call MD or return to Labor and Delivery if...    Labor (after 36 weeks)  - Painful contractions every 5 minutes for 2 hours that do not go away with 2 bottles of water, 2 tylenol, and rest.      Labor (before 36 weeks)  - More than 4 contractions in 1 hour   -First try 2 bottles of water, rest, and 2 tylenol.... If the contractions do not go away call doctors office or after hours clinic first and/or come to hospital for evaluation.      Water Breaks  - Gush or leaking of fluid from vagina, or if unsure.     Vaginal bleeding  - Bright red bleeding like a period, soaking a pad in 1 hour.    Decreased or No fetal movement  - You should feel 10 movements within two hours.  -If you are not feeling the baby move.... Drink a glass of orange juice or apple juice  - If you DO NOT feel 10 movements within two hours, please  call the MD or come to the hospital.    Unable to keep fluids or food down for more than 24 hours    Blurred vision, spots before your eyes, dizziness, headache that does not get better with Tylenol (Acetaminophen), bad swelling, chest pain, or trouble breathing.    Drink 10-12 glasses of water daily  Take medications as prescribed  Keep all follow-up appointments    Patient and spouse voiced understanding of the D/C instructions

## 2020-08-12 NOTE — NURSING
0645 Received report from Marcelina MENENDEZ night shift 20 year old G 1 P 0 at 36.1 wks. Patient does not have any vaginal bleeding, or leaking of vaginal fluid. Fetus: fetal heart tones A 128, baby B 135 positive fetal movements. Contractions irregular. Abdomen soft non-tender. Vital signs WNL Cervix: fingertip.  LR running at 125.  at the bedside. Educated on electronic fetal monitoring and assessments. Questions answered.

## 2020-08-12 NOTE — NURSING
"Late Entry: 2157: Updated Dr. Green via phone of ultrasound results: Both baby A and B are vertex, Baby A is 5lbs 2oz, Baby B is 6lbs, and "amniotic fluid volume is within normal limits. Maximum vertical pocket measures 5.1 cm for fetus A and 3.4 cm for fetus B." Also notified MD pt c/o migraine H/A and noticed clear fluid on pad. MD stated to inform pt to empty bladder more often, cut maintenance fluid back to 100 ml/hr, regular diet, tylenol 650 mg PO q6hrs PRN for H/A, PO benadryl to help pt sleep, and MD stated if pt c/o increasing pain to perform SVE and if not MD will perform SVE in the morning.   "

## 2020-08-13 ENCOUNTER — ROUTINE PRENATAL (OUTPATIENT)
Dept: OBSTETRICS AND GYNECOLOGY | Facility: CLINIC | Age: 20
End: 2020-08-13
Payer: MEDICAID

## 2020-08-13 VITALS
BODY MASS INDEX: 29.93 KG/M2 | WEIGHT: 185.44 LBS | DIASTOLIC BLOOD PRESSURE: 80 MMHG | SYSTOLIC BLOOD PRESSURE: 102 MMHG

## 2020-08-13 DIAGNOSIS — O30.043 DICHORIONIC DIAMNIOTIC TWIN PREGNANCY IN THIRD TRIMESTER: Primary | ICD-10-CM

## 2020-08-13 LAB — BACTERIA SPEC AEROBE CULT: NORMAL

## 2020-08-13 PROCEDURE — 99212 OFFICE O/P EST SF 10 MIN: CPT | Mod: PBBFAC,TH,PO | Performed by: OBSTETRICS & GYNECOLOGY

## 2020-08-13 PROCEDURE — 99213 OFFICE O/P EST LOW 20 MIN: CPT | Mod: TH,S$PBB,, | Performed by: OBSTETRICS & GYNECOLOGY

## 2020-08-13 PROCEDURE — 99999 PR PBB SHADOW E&M-EST. PATIENT-LVL II: ICD-10-PCS | Mod: PBBFAC,,, | Performed by: OBSTETRICS & GYNECOLOGY

## 2020-08-13 PROCEDURE — 99213 PR OFFICE/OUTPT VISIT, EST, LEVL III, 20-29 MIN: ICD-10-PCS | Mod: TH,S$PBB,, | Performed by: OBSTETRICS & GYNECOLOGY

## 2020-08-13 PROCEDURE — 99999 PR PBB SHADOW E&M-EST. PATIENT-LVL II: CPT | Mod: PBBFAC,,, | Performed by: OBSTETRICS & GYNECOLOGY

## 2020-08-17 ENCOUNTER — ROUTINE PRENATAL (OUTPATIENT)
Dept: OBSTETRICS AND GYNECOLOGY | Facility: CLINIC | Age: 20
End: 2020-08-17
Payer: MEDICAID

## 2020-08-17 VITALS
DIASTOLIC BLOOD PRESSURE: 80 MMHG | WEIGHT: 187.38 LBS | SYSTOLIC BLOOD PRESSURE: 122 MMHG | BODY MASS INDEX: 30.25 KG/M2

## 2020-08-17 DIAGNOSIS — Z3A.36 36 WEEKS GESTATION OF PREGNANCY: Primary | ICD-10-CM

## 2020-08-17 PROCEDURE — 99212 OFFICE O/P EST SF 10 MIN: CPT | Mod: PBBFAC,PN | Performed by: OBSTETRICS & GYNECOLOGY

## 2020-08-17 PROCEDURE — 99999 PR PBB SHADOW E&M-EST. PATIENT-LVL II: ICD-10-PCS | Mod: PBBFAC,,, | Performed by: OBSTETRICS & GYNECOLOGY

## 2020-08-17 PROCEDURE — 99213 PR OFFICE/OUTPT VISIT, EST, LEVL III, 20-29 MIN: ICD-10-PCS | Mod: TH,S$PBB,, | Performed by: OBSTETRICS & GYNECOLOGY

## 2020-08-17 PROCEDURE — 99213 OFFICE O/P EST LOW 20 MIN: CPT | Mod: TH,S$PBB,, | Performed by: OBSTETRICS & GYNECOLOGY

## 2020-08-17 PROCEDURE — 99999 PR PBB SHADOW E&M-EST. PATIENT-LVL II: CPT | Mod: PBBFAC,,, | Performed by: OBSTETRICS & GYNECOLOGY

## 2020-08-17 NOTE — PROGRESS NOTES
Doing well, feeling contractions  FHTs 130-150s x 2  cx 1/70/-2  RTC 1 week, IOL 38 wks scheduled on waiting list for Tuesday

## 2020-08-20 ENCOUNTER — ROUTINE PRENATAL (OUTPATIENT)
Dept: OBSTETRICS AND GYNECOLOGY | Facility: CLINIC | Age: 20
End: 2020-08-20
Payer: MEDICAID

## 2020-08-20 VITALS
DIASTOLIC BLOOD PRESSURE: 62 MMHG | SYSTOLIC BLOOD PRESSURE: 124 MMHG | BODY MASS INDEX: 30.09 KG/M2 | WEIGHT: 186.38 LBS

## 2020-08-20 DIAGNOSIS — Z3A.37 37 WEEKS GESTATION OF PREGNANCY: Primary | ICD-10-CM

## 2020-08-20 DIAGNOSIS — O30.043 DICHORIONIC DIAMNIOTIC TWIN PREGNANCY IN THIRD TRIMESTER: ICD-10-CM

## 2020-08-20 PROCEDURE — 99212 OFFICE O/P EST SF 10 MIN: CPT | Mod: PBBFAC,TH,PO | Performed by: OBSTETRICS & GYNECOLOGY

## 2020-08-20 PROCEDURE — 99213 PR OFFICE/OUTPT VISIT, EST, LEVL III, 20-29 MIN: ICD-10-PCS | Mod: TH,S$PBB,, | Performed by: OBSTETRICS & GYNECOLOGY

## 2020-08-20 PROCEDURE — 99999 PR PBB SHADOW E&M-EST. PATIENT-LVL II: ICD-10-PCS | Mod: PBBFAC,,, | Performed by: OBSTETRICS & GYNECOLOGY

## 2020-08-20 PROCEDURE — 99213 OFFICE O/P EST LOW 20 MIN: CPT | Mod: TH,S$PBB,, | Performed by: OBSTETRICS & GYNECOLOGY

## 2020-08-20 PROCEDURE — 99999 PR PBB SHADOW E&M-EST. PATIENT-LVL II: CPT | Mod: PBBFAC,,, | Performed by: OBSTETRICS & GYNECOLOGY

## 2020-08-20 NOTE — PROGRESS NOTES
Doing well  fht's 130x2  cx 1/70/-1/post  IOL next tues at 38 wks  BS#'s ok with diet  rtc Monday  Labor/ROM/VB precautions

## 2020-08-24 ENCOUNTER — HOSPITAL ENCOUNTER (INPATIENT)
Facility: HOSPITAL | Age: 20
LOS: 3 days | Discharge: HOME | End: 2020-08-27
Attending: OBSTETRICS & GYNECOLOGY | Admitting: OBSTETRICS & GYNECOLOGY
Payer: MEDICAID

## 2020-08-24 ENCOUNTER — ROUTINE PRENATAL (OUTPATIENT)
Dept: OBSTETRICS AND GYNECOLOGY | Facility: CLINIC | Age: 20
End: 2020-08-24
Payer: MEDICAID

## 2020-08-24 VITALS
SYSTOLIC BLOOD PRESSURE: 120 MMHG | BODY MASS INDEX: 30.63 KG/M2 | DIASTOLIC BLOOD PRESSURE: 80 MMHG | WEIGHT: 189.81 LBS

## 2020-08-24 DIAGNOSIS — Z34.90 ENCOUNTER FOR ELECTIVE INDUCTION OF LABOR: ICD-10-CM

## 2020-08-24 DIAGNOSIS — O30.043 DICHORIONIC DIAMNIOTIC TWIN PREGNANCY IN THIRD TRIMESTER: Primary | ICD-10-CM

## 2020-08-24 DIAGNOSIS — Z3A.37 37 WEEKS GESTATION OF PREGNANCY: ICD-10-CM

## 2020-08-24 LAB
BASOPHILS # BLD AUTO: 0.02 K/UL (ref 0–0.2)
BASOPHILS NFR BLD: 0.2 % (ref 0–1.9)
DIFFERENTIAL METHOD: ABNORMAL
EOSINOPHIL # BLD AUTO: 0.1 K/UL (ref 0–0.5)
EOSINOPHIL NFR BLD: 0.6 % (ref 0–8)
ERYTHROCYTE [DISTWIDTH] IN BLOOD BY AUTOMATED COUNT: 13.1 % (ref 11.5–14.5)
HCT VFR BLD AUTO: 39.6 % (ref 37–48.5)
HGB BLD-MCNC: 13.6 G/DL (ref 12–16)
HIV1+2 IGG SERPL QL IA.RAPID: NORMAL
IMM GRANULOCYTES # BLD AUTO: 0.07 K/UL (ref 0–0.04)
IMM GRANULOCYTES NFR BLD AUTO: 0.7 % (ref 0–0.5)
LYMPHOCYTES # BLD AUTO: 1.7 K/UL (ref 1–4.8)
LYMPHOCYTES NFR BLD: 17.4 % (ref 18–48)
MCH RBC QN AUTO: 32.2 PG (ref 27–31)
MCHC RBC AUTO-ENTMCNC: 34.3 G/DL (ref 32–36)
MCV RBC AUTO: 94 FL (ref 82–98)
MONOCYTES # BLD AUTO: 0.7 K/UL (ref 0.3–1)
MONOCYTES NFR BLD: 6.8 % (ref 4–15)
NEUTROPHILS # BLD AUTO: 7.2 K/UL (ref 1.8–7.7)
NEUTROPHILS NFR BLD: 74.3 % (ref 38–73)
NRBC BLD-RTO: 0 /100 WBC
PLATELET # BLD AUTO: 171 K/UL (ref 150–350)
PMV BLD AUTO: 10.7 FL (ref 9.2–12.9)
RBC # BLD AUTO: 4.23 M/UL (ref 4–5.4)
SARS-COV-2 RDRP RESP QL NAA+PROBE: NEGATIVE
WBC # BLD AUTO: 9.62 K/UL (ref 3.9–12.7)

## 2020-08-24 PROCEDURE — 99999 PR PBB SHADOW E&M-EST. PATIENT-LVL III: CPT | Mod: PBBFAC,,, | Performed by: OBSTETRICS & GYNECOLOGY

## 2020-08-24 PROCEDURE — 11000001 HC ACUTE MED/SURG PRIVATE ROOM

## 2020-08-24 PROCEDURE — 99213 OFFICE O/P EST LOW 20 MIN: CPT | Mod: TH,S$PBB,, | Performed by: OBSTETRICS & GYNECOLOGY

## 2020-08-24 PROCEDURE — 85025 COMPLETE CBC W/AUTO DIFF WBC: CPT

## 2020-08-24 PROCEDURE — 99213 PR OFFICE/OUTPT VISIT, EST, LEVL III, 20-29 MIN: ICD-10-PCS | Mod: TH,S$PBB,, | Performed by: OBSTETRICS & GYNECOLOGY

## 2020-08-24 PROCEDURE — U0002 COVID-19 LAB TEST NON-CDC: HCPCS

## 2020-08-24 PROCEDURE — 86901 BLOOD TYPING SEROLOGIC RH(D): CPT

## 2020-08-24 PROCEDURE — 86592 SYPHILIS TEST NON-TREP QUAL: CPT

## 2020-08-24 PROCEDURE — 86870 RBC ANTIBODY IDENTIFICATION: CPT

## 2020-08-24 PROCEDURE — 36415 COLL VENOUS BLD VENIPUNCTURE: CPT

## 2020-08-24 PROCEDURE — 86703 HIV-1/HIV-2 1 RESULT ANTBDY: CPT

## 2020-08-24 PROCEDURE — 86880 COOMBS TEST DIRECT: CPT

## 2020-08-24 PROCEDURE — 99999 PR PBB SHADOW E&M-EST. PATIENT-LVL III: ICD-10-PCS | Mod: PBBFAC,,, | Performed by: OBSTETRICS & GYNECOLOGY

## 2020-08-24 PROCEDURE — 99213 OFFICE O/P EST LOW 20 MIN: CPT | Mod: PBBFAC,TH,PN,25 | Performed by: OBSTETRICS & GYNECOLOGY

## 2020-08-24 RX ORDER — LANCETS 30 GAUGE
EACH MISCELLANEOUS
COMMUNITY
Start: 2020-06-18 | End: 2020-10-08

## 2020-08-24 RX ORDER — OXYTOCIN/RINGER'S LACTATE 30/500 ML
334 PLASTIC BAG, INJECTION (ML) INTRAVENOUS ONCE
Status: DISCONTINUED | OUTPATIENT
Start: 2020-08-25 | End: 2020-08-27 | Stop reason: HOSPADM

## 2020-08-24 RX ORDER — OXYTOCIN/RINGER'S LACTATE 30/500 ML
2 PLASTIC BAG, INJECTION (ML) INTRAVENOUS CONTINUOUS
Status: DISCONTINUED | OUTPATIENT
Start: 2020-08-25 | End: 2020-08-25

## 2020-08-24 RX ORDER — OXYTOCIN/RINGER'S LACTATE 30/500 ML
95 PLASTIC BAG, INJECTION (ML) INTRAVENOUS ONCE
Status: DISCONTINUED | OUTPATIENT
Start: 2020-08-24 | End: 2020-08-27 | Stop reason: HOSPADM

## 2020-08-24 RX ORDER — ONDANSETRON 8 MG/1
8 TABLET, ORALLY DISINTEGRATING ORAL EVERY 8 HOURS PRN
Status: DISCONTINUED | OUTPATIENT
Start: 2020-08-24 | End: 2020-08-25

## 2020-08-24 RX ORDER — SODIUM CHLORIDE 9 MG/ML
INJECTION, SOLUTION INTRAVENOUS
Status: DISCONTINUED | OUTPATIENT
Start: 2020-08-24 | End: 2020-08-25

## 2020-08-24 RX ORDER — SODIUM CHLORIDE, SODIUM LACTATE, POTASSIUM CHLORIDE, CALCIUM CHLORIDE 600; 310; 30; 20 MG/100ML; MG/100ML; MG/100ML; MG/100ML
INJECTION, SOLUTION INTRAVENOUS CONTINUOUS
Status: DISCONTINUED | OUTPATIENT
Start: 2020-08-25 | End: 2020-08-25

## 2020-08-24 RX ORDER — BUTORPHANOL TARTRATE 2 MG/ML
1 INJECTION INTRAMUSCULAR; INTRAVENOUS EVERY 4 HOURS PRN
Status: DISCONTINUED | OUTPATIENT
Start: 2020-08-24 | End: 2020-08-27 | Stop reason: HOSPADM

## 2020-08-24 RX ORDER — MISOPROSTOL 100 MCG
25 TABLET ORAL ONCE
Status: DISCONTINUED | OUTPATIENT
Start: 2020-08-25 | End: 2020-08-24

## 2020-08-24 RX ORDER — CALCIUM CARBONATE 200(500)MG
500 TABLET,CHEWABLE ORAL 3 TIMES DAILY PRN
Status: DISCONTINUED | OUTPATIENT
Start: 2020-08-24 | End: 2020-08-27 | Stop reason: HOSPADM

## 2020-08-24 RX ORDER — SIMETHICONE 80 MG
1 TABLET,CHEWABLE ORAL 4 TIMES DAILY PRN
Status: DISCONTINUED | OUTPATIENT
Start: 2020-08-24 | End: 2020-08-25

## 2020-08-24 NOTE — PROGRESS NOTES
Doing well  fht's 130-40's x 2  cx loose 1 70 -3  IOL scheduled for MN tonight  Will come in around 10:30  cytotec x 1 at [place], pitocin at 4 am  accu checks q4hrs  Will AROM at 7am, will do delivery in OR

## 2020-08-25 ENCOUNTER — ANESTHESIA EVENT (OUTPATIENT)
Dept: OBSTETRICS AND GYNECOLOGY | Facility: HOSPITAL | Age: 20
End: 2020-08-25
Payer: MEDICAID

## 2020-08-25 ENCOUNTER — ANESTHESIA (OUTPATIENT)
Dept: OBSTETRICS AND GYNECOLOGY | Facility: HOSPITAL | Age: 20
End: 2020-08-25
Payer: MEDICAID

## 2020-08-25 LAB
ABO + RH BLD: NORMAL
BLD GP AB SCN CELLS X3 SERPL QL: NORMAL
BLOOD GROUP ANTIBODIES SERPL: NORMAL
DAT IGG-SP REAG RBC-IMP: NORMAL
POCT GLUCOSE: 62 MG/DL (ref 70–110)
RPR SER QL: NORMAL

## 2020-08-25 PROCEDURE — 27201127 HC VACUUM EXTRACTOR

## 2020-08-25 PROCEDURE — 72200006 HC VAGINAL DELIVERY LEVEL III

## 2020-08-25 PROCEDURE — 72100002 HC LABOR CARE, 1ST 8 HOURS

## 2020-08-25 PROCEDURE — 51702 INSERT TEMP BLADDER CATH: CPT

## 2020-08-25 PROCEDURE — 25000003 PHARM REV CODE 250: Performed by: OBSTETRICS & GYNECOLOGY

## 2020-08-25 PROCEDURE — 72100003 HC LABOR CARE, EA. ADDL. 8 HRS

## 2020-08-25 PROCEDURE — 27800516 HC TRAY, EPIDURAL COMBO: Performed by: ANESTHESIOLOGY

## 2020-08-25 PROCEDURE — 27200710 HC EPIDURAL INFUSION PUMP SET: Performed by: ANESTHESIOLOGY

## 2020-08-25 PROCEDURE — 59409 OBSTETRICAL CARE: CPT | Mod: 22,AT,, | Performed by: OBSTETRICS & GYNECOLOGY

## 2020-08-25 PROCEDURE — 63600175 PHARM REV CODE 636 W HCPCS: Performed by: OBSTETRICS & GYNECOLOGY

## 2020-08-25 PROCEDURE — 59409 PR OBSTETRICAL CARE,VAG DELIV ONLY: ICD-10-PCS | Mod: 22,AT,, | Performed by: OBSTETRICS & GYNECOLOGY

## 2020-08-25 PROCEDURE — 25000003 PHARM REV CODE 250: Performed by: ANESTHESIOLOGY

## 2020-08-25 PROCEDURE — S0020 INJECTION, BUPIVICAINE HYDRO: HCPCS | Performed by: ANESTHESIOLOGY

## 2020-08-25 PROCEDURE — 62326 NJX INTERLAMINAR LMBR/SAC: CPT | Performed by: STUDENT IN AN ORGANIZED HEALTH CARE EDUCATION/TRAINING PROGRAM

## 2020-08-25 PROCEDURE — 11000001 HC ACUTE MED/SURG PRIVATE ROOM

## 2020-08-25 RX ORDER — FENTANYL/BUPIVACAINE/NS/PF 2MCG/ML-.1
12 PLASTIC BAG, INJECTION (ML) INJECTION CONTINUOUS
Status: DISCONTINUED | OUTPATIENT
Start: 2020-08-25 | End: 2020-08-25

## 2020-08-25 RX ORDER — ONDANSETRON 8 MG/1
8 TABLET, ORALLY DISINTEGRATING ORAL EVERY 8 HOURS PRN
Status: DISCONTINUED | OUTPATIENT
Start: 2020-08-25 | End: 2020-08-27 | Stop reason: HOSPADM

## 2020-08-25 RX ORDER — OXYTOCIN 10 [USP'U]/ML
10 INJECTION, SOLUTION INTRAMUSCULAR; INTRAVENOUS
Status: DISCONTINUED | OUTPATIENT
Start: 2020-08-25 | End: 2020-08-27 | Stop reason: HOSPADM

## 2020-08-25 RX ORDER — METOCLOPRAMIDE HYDROCHLORIDE 5 MG/ML
10 INJECTION INTRAMUSCULAR; INTRAVENOUS
Status: CANCELLED | OUTPATIENT
Start: 2020-08-25

## 2020-08-25 RX ORDER — OXYCODONE AND ACETAMINOPHEN 10; 325 MG/1; MG/1
1 TABLET ORAL EVERY 4 HOURS PRN
Status: DISCONTINUED | OUTPATIENT
Start: 2020-08-25 | End: 2020-08-27 | Stop reason: HOSPADM

## 2020-08-25 RX ORDER — SIMETHICONE 80 MG
1 TABLET,CHEWABLE ORAL EVERY 6 HOURS PRN
Status: DISCONTINUED | OUTPATIENT
Start: 2020-08-25 | End: 2020-08-27 | Stop reason: HOSPADM

## 2020-08-25 RX ORDER — FAMOTIDINE 10 MG/ML
20 INJECTION INTRAVENOUS
Status: CANCELLED | OUTPATIENT
Start: 2020-08-25

## 2020-08-25 RX ORDER — DOCUSATE SODIUM 100 MG/1
200 CAPSULE, LIQUID FILLED ORAL 2 TIMES DAILY PRN
Status: DISCONTINUED | OUTPATIENT
Start: 2020-08-25 | End: 2020-08-27 | Stop reason: HOSPADM

## 2020-08-25 RX ORDER — MISOPROSTOL 200 UG/1
800 TABLET ORAL
Status: DISCONTINUED | OUTPATIENT
Start: 2020-08-25 | End: 2020-08-27 | Stop reason: HOSPADM

## 2020-08-25 RX ORDER — OXYCODONE AND ACETAMINOPHEN 5; 325 MG/1; MG/1
1 TABLET ORAL EVERY 4 HOURS PRN
Status: DISCONTINUED | OUTPATIENT
Start: 2020-08-25 | End: 2020-08-27 | Stop reason: HOSPADM

## 2020-08-25 RX ORDER — CARBOPROST TROMETHAMINE 250 UG/ML
250 INJECTION, SOLUTION INTRAMUSCULAR
Status: DISCONTINUED | OUTPATIENT
Start: 2020-08-25 | End: 2020-08-27 | Stop reason: HOSPADM

## 2020-08-25 RX ORDER — FENTANYL/BUPIVACAINE/NS/PF 2MCG/ML-.1
PLASTIC BAG, INJECTION (ML) INJECTION CONTINUOUS PRN
Status: DISCONTINUED | OUTPATIENT
Start: 2020-08-25 | End: 2020-08-25

## 2020-08-25 RX ORDER — SODIUM CITRATE AND CITRIC ACID MONOHYDRATE 334; 500 MG/5ML; MG/5ML
30 SOLUTION ORAL
Status: CANCELLED | OUTPATIENT
Start: 2020-08-25

## 2020-08-25 RX ORDER — BUPIVACAINE HYDROCHLORIDE 5 MG/ML
30 INJECTION, SOLUTION EPIDURAL; INTRACAUDAL ONCE
Status: COMPLETED | OUTPATIENT
Start: 2020-08-25 | End: 2020-08-25

## 2020-08-25 RX ORDER — HYDROCORTISONE 25 MG/G
CREAM TOPICAL 3 TIMES DAILY PRN
Status: DISCONTINUED | OUTPATIENT
Start: 2020-08-25 | End: 2020-08-27 | Stop reason: HOSPADM

## 2020-08-25 RX ORDER — IBUPROFEN 600 MG/1
600 TABLET ORAL EVERY 6 HOURS PRN
Status: DISCONTINUED | OUTPATIENT
Start: 2020-08-25 | End: 2020-08-27 | Stop reason: HOSPADM

## 2020-08-25 RX ORDER — ACETAMINOPHEN 325 MG/1
650 TABLET ORAL EVERY 6 HOURS PRN
Status: DISCONTINUED | OUTPATIENT
Start: 2020-08-25 | End: 2020-08-27 | Stop reason: HOSPADM

## 2020-08-25 RX ORDER — METHYLERGONOVINE MALEATE 0.2 MG/ML
200 INJECTION INTRAVENOUS
Status: DISCONTINUED | OUTPATIENT
Start: 2020-08-25 | End: 2020-08-27 | Stop reason: HOSPADM

## 2020-08-25 RX ORDER — DIPHENHYDRAMINE HYDROCHLORIDE 50 MG/ML
25 INJECTION INTRAMUSCULAR; INTRAVENOUS EVERY 4 HOURS PRN
Status: DISCONTINUED | OUTPATIENT
Start: 2020-08-25 | End: 2020-08-27 | Stop reason: HOSPADM

## 2020-08-25 RX ORDER — FENTANYL/BUPIVACAINE/NS/PF 2MCG/ML-.1
PLASTIC BAG, INJECTION (ML) INJECTION
Status: COMPLETED
Start: 2020-08-25 | End: 2020-08-25

## 2020-08-25 RX ORDER — NALBUPHINE HYDROCHLORIDE 10 MG/ML
2.5 INJECTION, SOLUTION INTRAMUSCULAR; INTRAVENOUS; SUBCUTANEOUS EVERY 6 HOURS PRN
Status: DISCONTINUED | OUTPATIENT
Start: 2020-08-25 | End: 2020-08-25

## 2020-08-25 RX ADMIN — Medication 12 ML/HR: at 05:08

## 2020-08-25 RX ADMIN — BUPIVACAINE HYDROCHLORIDE 150 MG: 5 INJECTION, SOLUTION EPIDURAL; INTRACAUDAL; PERINEURAL at 05:08

## 2020-08-25 RX ADMIN — Medication 2 MILLI-UNITS/MIN: at 12:08

## 2020-08-25 RX ADMIN — OXYCODONE HYDROCHLORIDE AND ACETAMINOPHEN 1 TABLET: 10; 325 TABLET ORAL at 03:08

## 2020-08-25 RX ADMIN — IBUPROFEN 600 MG: 600 TABLET, FILM COATED ORAL at 03:08

## 2020-08-25 RX ADMIN — IBUPROFEN 600 MG: 600 TABLET, FILM COATED ORAL at 09:08

## 2020-08-25 RX ADMIN — SODIUM CHLORIDE, SODIUM LACTATE, POTASSIUM CHLORIDE, AND CALCIUM CHLORIDE 125 ML/HR: .6; .31; .03; .02 INJECTION, SOLUTION INTRAVENOUS at 07:08

## 2020-08-25 RX ADMIN — OXYCODONE HYDROCHLORIDE AND ACETAMINOPHEN 1 TABLET: 10; 325 TABLET ORAL at 09:08

## 2020-08-25 RX ADMIN — SODIUM CHLORIDE, SODIUM LACTATE, POTASSIUM CHLORIDE, AND CALCIUM CHLORIDE: .6; .31; .03; .02 INJECTION, SOLUTION INTRAVENOUS at 12:08

## 2020-08-25 NOTE — NURSING
Patient finished maintenance pitocin. IV saline locked 1435. Epidural removed 1430. Patient regaining feeling in legs. Patient refused restroom at this time.

## 2020-08-25 NOTE — NURSING
"Patient's pain subsided (3/10) and stated she felt less anxiety. SHON Dumas helped patient urinate using bedpan and changed patient into pad/underwear. Patient sitting up in bed, vital signs stable, states she "feels better" and is "able to stand" and hold herself up. SHON Funez and SN Darlyn to transfer to postpartum.   "

## 2020-08-25 NOTE — H&P
History and Physical  Obstetrics      SUBJECTIVE:     Mar Luna is a 20 y.o.  female with an Estimated Date of Delivery: 20 admitted for induction of labor.  Her current obstetrical history is significant for Di/Di Twin gestation, gest DM, Rh-, Asthma.  She reports contractions since past week. Fetal Movement: normal.    PTA Medications   Medication Sig    ONETOUCH DELICA LANCETS 33 gauge Misc USE TO TEST BLOOD SUGAR 4 TO 6 TIMES DAILY    ONETOUCH ULTRA BLUE TEST STRIP Strp USE TO TEST BLOOD SUGAR 4 6 TIMES A DAY    ONETOUCH ULTRA2 METER Misc USE TO TEST 4 6 TIMES DAILY    PNV no.95/ferrous fum/folic ac (PRENATAL ORAL) Take 1 capsule by mouth once daily.    rizatriptan (MAXALT) 10 MG tablet Take 1 tablet (10 mg total) by mouth as needed for Migraine.    sumatriptan (IMITREX) 50 MG tablet Take 1 tablet (50 mg total) by mouth once. Take tablet at onset of Headache then every 2 hours that headache persists. for 1 dose       Review of patient's allergies indicates:   Allergen Reactions    Latex, natural rubber Hives        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    diagnosed by psychiatry    Asthma     mostly exercise-induced now    Dyslexia     Headache, tension-type     Worsening headaches 2016    followed by neurology     Past Surgical History:   Procedure Laterality Date    TONSILLECTOMY  0 2017     Family History   Problem Relation Age of Onset    Breast cancer Paternal Grandmother     Cancer Paternal Grandmother 65        breast    Headaches Mother     No Known Problems Father     Cancer Maternal Grandfather         passed age 74    No Known Problems Sister     No Known Problems Brother     Colon cancer Neg Hx     Ovarian cancer Neg Hx      Social History     Tobacco Use    Smoking status: Never Smoker    Smokeless tobacco: Never Used   Substance Use Topics    Alcohol use: No    Drug use: No     ROS:  GENERAL:  Feeling well overall. Denies fever or chills.   SKIN: Denies rash or lesions.   HEAD: Denies head injury or headache.   NODES: Denies enlarged lymph nodes.   CHEST: Denies chest pain or shortness of breath.   CARDIOVASCULAR: Denies palpitations or left sided chest pain.    ABDOMEN: Denies diarrhea, nausea, vomiting or rectal bleeding.   URINARY: No dysuria, hematuria, or burning on urination.  REPRODUCTIVE: See HPI.   BREASTS: Denies pain, lumps, or nipple discharge.   HEMATOLOGIC: No easy bruisability or excessive bleeding.   MUSCULOSKELETAL: Denies joint pain or swelling.   NEUROLOGIC: Denies syncope or weakness.   PSYCHIATRIC: Denies depression, anxiety or mood swings.         Vital Signs (Most Recent)  Temp: 98.7 °F (37.1 °C) (20 2335)  Pulse: 71 (20 0650)  BP: 122/76 (20 0650)  SpO2: 98 % (20 0650)    Physical Exam:  General:  alert and normal appearing gravid female   Skin:  Skin color, texture, turgor normal. No rashes or lesions   HEENT:  conjunctivae/corneas clear. PERRL.   Lungs:  clear to auscultation bilaterally   Heart:  regular rate and rhythm       Abdomen:  soft, non-tender; bowel sounds normal   Uterine Size:  size greater than dates   Presentations:  cephalic   FHT:  120's and 130's BPM   Pelvis: Exam deferred.   Cervix:     Dilation: 2cm    Effacement: 75%    Station:  -1    Consistency: soft    Position: anterior     Laboratory:  No results for input(s): ABORH, HEPBSAG, RUBELLAIGGSC, GBS, AFP, AGOZEIB5HD, CBC in the last 168 hours.   ASSESSMENT/PLAN:     38w0d gestation.  Early latent labor.     Conditions: twins, asthma, Rh- and gest DM-diet controlled    Patient Active Problem List   Diagnosis    Adjustment disorder of adolescence    Asthma    Chronic migraine    Pre-conception counseling    Analgesic rebound headache    Fall    Dichorionic diamniotic twin pregnancy in third trimester    Amniotic fluid leaking    Encounter for elective induction of  labor        Risks, benefits, alternatives and possible complications have been discussed in detail with the patient.  Pre-admission, admission, and post admission procedures and expectations were discussed in detail.  All questions answered, all appropriate consents will be signed at the Hospital. Admission is planned for today.   Epidural  accuchecks q 4hr

## 2020-08-25 NOTE — NURSING
Dr. Green called and updated on pt SVE and EFM. SVE 2/70/-3. Pt ctx 1-3 mins. Orders given to start pitocin at 0000. Will continue to closely monitor and update pt on POC.

## 2020-08-25 NOTE — NURSING
Attempted to ambulate patient with aid from Mar Fabian; patient began complaining of uterine cramping 7/10 upon standing. Medicated patient with PRN paid medication. Patient became woozy upon visualizing bloody fco pad. Assisted patient back to bed. Patient states she has history of anxiety and panic attacks. Will remain at bedside until transfer and alert postpartum nurse regarding patient's anxiety.

## 2020-08-25 NOTE — ANESTHESIA PROCEDURE NOTES
CSE    Patient location during procedure: OB  Start time: 8/25/2020 5:40 AM  Timeout: 8/25/2020 5:45 AM  End time: 8/25/2020 6:05 AM    Staffing  Authorizing Provider: Leah Mcintosh MD  Performing Provider: Dylon Hughes MD    Preanesthetic Checklist  Completed: patient identified, site marked, surgical consent, pre-op evaluation, timeout performed, IV checked, risks and benefits discussed and monitors and equipment checked  CSE  Patient position: sitting  Prep: ChloraPrep  Patient monitoring: heart rate, continuous pulse ox and frequent blood pressure checks  Approach: midline  Spinal Needle  Needle type: pencil-tip   Needle gauge: 25 G  Needle length: 5 in  Epidural Needle  Injection technique: CHELY air  Needle type: Tuohy   Needle gauge: 17 G  Needle length: 3.5 in  Needle insertion depth: 8 cm  Location: L3-4  Needle localization: anatomical landmarks  Catheter  Catheter type: Lamoda  Catheter size: 19 G  Catheter at skin depth: 14 cm  Test dose: negative and lidocaine 1.5% with Epi 1-to-200,000  Additional Documentation: negative aspiration for CSF, negative aspiration for heme, incremental injection, no paresthesia on injection and negative test dose  Assessment  Sensory level: T8   Dermatomal levels determined by pinch or prick  Intrathecal Medications:  administered: primary anesthetic mcg of

## 2020-08-25 NOTE — NURSING
Pt arrived to L&D for scheduled IOL. Orders received from Dr. Green prior to pt arrival: cytotec x1 25mcg vaginal at 0000 and pitocin to be started at 0400; pt okay to receive stadol and phenergan as needed for pain.

## 2020-08-25 NOTE — NURSING
Patient delivered two live infants, herein referred to as infant A (male) and infant B (female) in OR3. Infant A born at 1012 with aid of vacuum, infant B born at 1038 with aid of forceps. Both infants APGAR 9/9. Placenta delivered intact without issue at 1041. Epidural anesthesia infused providing complete pain relief throughout delivery.Continuous fetal monitoring of both infants in OR during delivery, audible variable decelerations noted infant B.  Patient sustained 2nd degree midline laceration and left vaginal wall laceration repaired by MD with sutures.   Patient tandem breastfeeding infants at 1250 in room 350. Uterus firm and slightly right of umbilicus, moving more toward midline progressively. Scant rubra noted on pad. Pitocin maintenance dose bolusing at 95 mu/min.   Good bonding noted between parents and babies. Patient reports no pain at this time. Will continue plan of care.

## 2020-08-25 NOTE — LACTATION NOTE
This note was copied from a baby's chart.    [facility]  Lactation Note - Baby    SUMMARY     Feeding Method    breastfeeding    Breastfeeding    breastfeeding, left side only    LATCH Score    Latch: 2-->grasps breast, tongue down, lips flanged, rhythmic sucking  Audible Swallowin-->spontaneous and intermittent (24 hrs old)  Type of Nipple: 2-->everted (after stimulation)  Comfort (Breast/Nipple): 2-->soft/nontender  Hold (Positioning): 1-->minimal assist, teach one side, mother does other, staff holds  Score: 9    Breastfeeding Supplementation         Nutrition Interventions

## 2020-08-25 NOTE — ANESTHESIA PREPROCEDURE EVALUATION
08/25/2020  Mar Luna is a 20 y.o., female G1PO w/ hx of asthma presents for request for epidural, spinal, CSE.     Recent Labs   Lab 08/24/20  2308   WBC 9.62   RBC 4.23   HGB 13.6   HCT 39.6      MCV 94   MCH 32.2*   MCHC 34.3       Anesthesia Evaluation    I have reviewed the Patient Summary Reports.    I have reviewed the Nursing Notes. I have reviewed the NPO Status.   I have reviewed the Medications.     Review of Systems  Anesthesia Hx:  Denies Hx of Anesthetic complications  History of prior surgery of interest to airway management or planning: Denies Family Hx of Anesthesia complications.   Denies Personal Hx of Anesthesia complications.   Hematology/Oncology:     Oncology Normal    -- Denies Anemia:   EENT/Dental:EENT/Dental Normal   Cardiovascular:  Cardiovascular Normal     Pulmonary:   Denies Pneumonia Denies COPD. Asthma  Denies Shortness of breath.    Renal/:  Renal/ Normal     Hepatic/GI:  Hepatic/GI Normal    Musculoskeletal:  Musculoskeletal Normal    OB/GYN/PEDS:  Denies hx of birth complications in the past. Denies eclampsia, pre-eclampsia.    Neurological:   Headaches    Endocrine:  Endocrine Normal    Dermatological:  Skin Normal    Psych:   Psychiatric History          Physical Exam  General:  Well nourished    Airway/Jaw/Neck:    Mallampati II    TM Distance > 3 Finger breaths    Eyes/Ears/Nose:  EYES/EARS/NOSE FINDINGS: Normal   Dental:  DENTAL FINDINGS: Normal   Chest/Lungs:  Chest/Lungs Clear    Heart/Vascular:  Heart Findings: Normal Heart murmur: negative Vascular Findings: Normal    Abdomen:  Abdomen Findings: Normal    Musculoskeletal:  Musculoskeletal Findings: Normal   Skin:  Skin Findings: Normal    Mental Status:  Mental Status Findings: Normal        Anesthesia Plan  Type of Anesthesia, risks & benefits discussed:  Anesthesia Type:  epidural, CSE,  spinal  Patient's Preference:   Intra-op Monitoring Plan: standard ASA monitors  Intra-op Monitoring Plan Comments:   Post Op Pain Control Plan: multimodal analgesia and IV/PO Opioids PRN  Post Op Pain Control Plan Comments:   Induction:   IV  Beta Blocker:         Informed Consent: Patient understands risks and agrees with Anesthesia plan.  Questions answered. Anesthesia consent signed with patient.  ASA Score: 2     Day of Surgery Review of History & Physical: I have interviewed and examined the patient. I have reviewed the patient's H&P dated:  There are no significant changes.  H&P update referred to the surgeon.         Ready For Surgery From Anesthesia Perspective.

## 2020-08-25 NOTE — NURSING
Patient's vital signs stable, good bonding noted between family. Patient's partner to remain at bedside. Patient's uterus firm without massage, very slightly to right of midline below umbilicus. Scant rubra, non odorous. SHON Fleming and SN Darlyn to prepare patient for transfer to postpartum unit- will attempt to help patient void, change fco pads.   Educated patient regarding nutrition, breastfeeding, and reporting excessive bleeding to RN. Patient breastfeeding successfully and reporting 0/10 pain at this time.   Transferred to postpartum room 306.

## 2020-08-25 NOTE — L&D DELIVERY NOTE
[facility]  Vaginal Delivery   Operative Note    SUMMARY     Normal spontaneous vaginal delivery and Low forceps delivery of twin B x 2 of live infants, skin to skin was unable to be performed due to twin vaginal delivery but skin to skin done after delivery of twin B.  Infant delivered position OA over perineum.and OP for twin B  Nuchal cord: No.    Spontaneous delivery of placenta and IV pitocin given noting good uterine tone.Left sidewall and   2nd degree laceration noted and repaired in normal fashion with 2-0 and 3-0 chromic.  Patient tolerated delivery well. Sponge needle and lap counted correctly x2.    Indications: <principal problem not specified>  Pregnancy complicated by:   Patient Active Problem List   Diagnosis    Adjustment disorder of adolescence    Asthma    Chronic migraine    Pre-conception counseling    Analgesic rebound headache    Fall    Dichorionic diamniotic twin pregnancy in third trimester    Amniotic fluid leaking    Encounter for elective induction of labor     Admitting GA: 38w0d       Margarito Luna [[id number]]     Delivery Information for Margarito Luna    Birth information:  YOB: 2021   Time of birth: 10:12 AM   Sex: male   Head Delivery Date/Time:     Delivery type:    Gestational Age: 38w0d    Delivery Providers    Delivering clinician:            Measurements    Weight:   Length:          Apgars    Living status:   Apgars:  1 min.:  5 min.:  10 min.:  15 min.:  20 min.:    Skin color:         Heart rate:         Reflex irritability:         Muscle tone:         Respiratory effort:         Total:                                Interventions/Resuscitation         Cord    No data filed        Placenta    Placenta delivery date/time:   Placenta removal:            Labor Events:       labor: No     Labor Onset Date/Time:         Dilation Complete Date/Time: 2020 09:45     Start Pushing Date/Time: 2020 10:05       Start  Pushing Date/Time: 2020 10:05     Rupture Date/Time: 20  0715         Rupture type:          Fluid Amount:       Fluid Color: Clear      Fluid Odor:       Membrane Status: ARM (Artificial Rupture)               steroids:       Antibiotics given for GBS:       Induction:       Indications for induction:        Augmentation:       Indications for augmentation:       Labor complications:       Additional complications:          Cervical ripening:                     Delivery:      Episiotomy:       Indication for Episiotomy:       Perineal Lacerations:   Repaired:      Periurethral Laceration:   Repaired:     Labial Laceration:   Repaired:     Sulcus Laceration:   Repaired:     Vaginal Laceration:   Repaired:     Cervical Laceration:   Repaired:     Repair suture:       Repair # of packets:       Last Value - EBL - Nursing (mL):       Sum - EBL - Nursing (mL): 0     Last Value - EBL - Anesthesia (mL):      Calculated QBL (mL):       Vaginal Sweep Performed:       Surgicount Correct:         Other providers:            Details (if applicable):  Trial of Labor      Categorization:      Priority:     Indications for :     Incision Type:       Additional  information:  Forceps:    Vacuum:    Breech:    Observed anomalies    Other (Comments):            Cheryl CHIKIS Manuel [[id number]]     Delivery Information for CHIKIS Luna    Birth information:  YOB: 2021   Time of birth: 10:38 AM   Sex: female   Head Delivery Date/Time:     Delivery type:    Gestational Age: 38w0d    Delivery Providers    Delivering clinician:            Measurements    Weight:   Length:          Apgars    Living status:   Apgars:  1 min.:  5 min.:  10 min.:  15 min.:  20 min.:    Skin color:         Heart rate:         Reflex irritability:         Muscle tone:         Respiratory effort:         Total:                                Interventions/Resuscitation         Cord     No data filed        Placenta    Placenta delivery date/time:   Placenta removal:            Labor Events:       labor: No     Labor Onset Date/Time:         Dilation Complete Date/Time: 2020 09:45     Start Pushing Date/Time: 2020 10:05       Start Pushing Date/Time: 2020 10:05     Rupture Date/Time: 20  0715         Rupture type:          Fluid Amount:       Fluid Color: Clear      Fluid Odor:       Membrane Status: ARM (Artificial Rupture)               steroids: None     Antibiotics given for GBS: No     Induction: oxytocin     Indications for induction:  Multiple Gestation     Augmentation: oxytocin;amniotomy     Indications for augmentation:       Labor complications: None     Additional complications:          Cervical ripening:                     Delivery:      Episiotomy:       Indication for Episiotomy:       Perineal Lacerations:   Repaired:      Periurethral Laceration:   Repaired:     Labial Laceration:   Repaired:     Sulcus Laceration:   Repaired:     Vaginal Laceration:   Repaired:     Cervical Laceration:   Repaired:     Repair suture:       Repair # of packets:       Last Value - EBL - Nursing (mL):       Sum - EBL - Nursing (mL): 0     Last Value - EBL - Anesthesia (mL):      Calculated QBL (mL):       Vaginal Sweep Performed:       Surgicount Correct:         Other providers:            Details (if applicable):  Trial of Labor      Categorization:      Priority:     Indications for :     Incision Type:       Additional  information:  Forceps:    Vacuum:    Breech:    Observed anomalies    Other (Comments):

## 2020-08-25 NOTE — NURSING
Patient sitting up in bed at 0710 with  at bedside. Dr Green ruptured patient's membranes- clear liquid noted. Patient rated pain 0/10, VSS, FHTs 125/145 and reactive with accelerations noted on both strips. Contraction pattern regular with pitocin infusing at 8 mu/min.   AM glucose 62 with no symptoms of hypoglycemia noted- patient given apple juice. Pulse ox moved to right toe, fall risk socks placed on patient.  at bedside, patient instructed on use of call light, call light in reach, SR x2.

## 2020-08-25 NOTE — LACTATION NOTE
"  Called to assist with breastfeeding for twins with hypoglycemia and Twin A (boy) with difficulty latching. Twin A (boy) attempted to latch on L breast initially and unsuccessful. Attempted to latch on R breast in football hold and was able to latch briefly, however frequently slips and mother reports feeling "pinching". nipple appears pinched when infant comes off. On assessment, slight heart shaping noted at tip of infant's tongue. Discussed implications with mother. Used deep breast compression while continuing to relatch infant frequently. Able to hand express colotrum easily. 1-2 swallows noted throughout duration of feeding.    Twin B (girl) mother reported  well after delivery, however, had the lower blood sugar. Infant rooting now. Ling RN,IBCLC called to room to assist with holding her at L breast while I continued to hold twin A (boy) at R. Twin B (girl) able to maintain good, deep latch effectively with little assistance and lots of swallowing noted.      [facility]  Lactation Note - Mom    SUMMARY     Maternal Assessment    Breast Size Issue: none  Breast Shape: Bilateral:, round  Breast Density: Bilateral:, soft  Areola: Bilateral:, elastic  Nipples: Bilateral:, everted, graspable(w/ stim)  Left Nipple Symptoms: other (see comments)(denies pain)  Right Nipple Symptoms: tender      LATCH Score     see  flowsheets    Breasts WDL    Breast WDL: WDL  Left Nipple Symptoms: other (see comments)(denies pain)  Right Nipple Symptoms: tender    Maternal Infant Feeding    Maternal Preparation: breast care, hand hygiene  Maternal Emotional State: assist needed, relaxed  Infant Positioning: clutch/football  Signs of Milk Transfer: audible swallow(girl- lots, boy- 1-2)  Pain with Feeding: yes(6 w/ boy latching)  Pain Location: nipples, bilateral, uterine cramping  Pain Description: soreness  Comfort Measures Before/During Feeding: suction broken using finger, infant position " adjusted, latch adjusted  Comfort Measures Following Feeding: air-drying encouraged, expressed milk applied, other (see comments)(lanolin given)  Nipple Shape After Feeding, Left: round(girl)  Nipple Shape After Feeding, Right: pinched(boy)  Latch Assistance: yes    Lactation Referrals    Lactation Referrals: outpatient lactation program  Outpatient Lactation Program Lactation Follow-up Date/Time: call lac ctr prn    Lactation Interventions    Breast Care: Breastfeeding: lanolin to nipples, breast milk to nipples, open to air  Breastfeeding Assistance: assisted with positioning, infant latch-on verified, infant suck/swallow verified, support offered, feeding cue recognition promoted, feeding on demand promoted  Breast Care: Breastfeeding: lanolin to nipples, breast milk to nipples, open to air  Breastfeeding Assistance: assisted with positioning, infant latch-on verified, infant suck/swallow verified, support offered, feeding cue recognition promoted, feeding on demand promoted  Breastfeeding Support: diary/feeding log utilized, encouragement provided, infant-mother separation minimized, maternal hydration promoted, maternal nutrition promoted, maternal rest encouraged       Breastfeeding Session    Infant Positioning: clutch/football  Signs of Milk Transfer: audible swallow(girl- lots, boy- 1-2)    Maternal Information    Date of Referral: 20  Person Making Referral: nurse  Maternal Reason for Referral: multiple births  Infant Reason for Referral:  infant

## 2020-08-26 PROBLEM — Z34.90 ENCOUNTER FOR ELECTIVE INDUCTION OF LABOR: Status: RESOLVED | Noted: 2020-08-24 | Resolved: 2020-08-26

## 2020-08-26 LAB
ABO + RH BLD: NORMAL
BASOPHILS # BLD AUTO: 0.03 K/UL (ref 0–0.2)
BASOPHILS NFR BLD: 0.3 % (ref 0–1.9)
BLD GP AB SCN CELLS X3 SERPL QL: NORMAL
DIFFERENTIAL METHOD: ABNORMAL
EOSINOPHIL # BLD AUTO: 0.1 K/UL (ref 0–0.5)
EOSINOPHIL NFR BLD: 0.8 % (ref 0–8)
ERYTHROCYTE [DISTWIDTH] IN BLOOD BY AUTOMATED COUNT: 13.3 % (ref 11.5–14.5)
FETAL CELL SCN BLD QL ROSETTE: NORMAL
HCT VFR BLD AUTO: 33.5 % (ref 37–48.5)
HGB BLD-MCNC: 11.3 G/DL (ref 12–16)
IMM GRANULOCYTES # BLD AUTO: 0.04 K/UL (ref 0–0.04)
IMM GRANULOCYTES NFR BLD AUTO: 0.4 % (ref 0–0.5)
INJECT RH IG VOL PATIENT: NORMAL ML
LYMPHOCYTES # BLD AUTO: 1.6 K/UL (ref 1–4.8)
LYMPHOCYTES NFR BLD: 16.6 % (ref 18–48)
MCH RBC QN AUTO: 32.5 PG (ref 27–31)
MCHC RBC AUTO-ENTMCNC: 33.7 G/DL (ref 32–36)
MCV RBC AUTO: 96 FL (ref 82–98)
MONOCYTES # BLD AUTO: 0.6 K/UL (ref 0.3–1)
MONOCYTES NFR BLD: 6.5 % (ref 4–15)
NEUTROPHILS # BLD AUTO: 7.4 K/UL (ref 1.8–7.7)
NEUTROPHILS NFR BLD: 75.4 % (ref 38–73)
NRBC BLD-RTO: 0 /100 WBC
PLATELET # BLD AUTO: 145 K/UL (ref 150–350)
PMV BLD AUTO: 10.8 FL (ref 9.2–12.9)
RBC # BLD AUTO: 3.48 M/UL (ref 4–5.4)
WBC # BLD AUTO: 9.86 K/UL (ref 3.9–12.7)

## 2020-08-26 PROCEDURE — 85461 HEMOGLOBIN FETAL: CPT

## 2020-08-26 PROCEDURE — 85025 COMPLETE CBC W/AUTO DIFF WBC: CPT

## 2020-08-26 PROCEDURE — 36415 COLL VENOUS BLD VENIPUNCTURE: CPT

## 2020-08-26 PROCEDURE — 99231 PR SUBSEQUENT HOSPITAL CARE,LEVL I: ICD-10-PCS | Mod: ,,, | Performed by: OBSTETRICS & GYNECOLOGY

## 2020-08-26 PROCEDURE — 86850 RBC ANTIBODY SCREEN: CPT

## 2020-08-26 PROCEDURE — 63600519 RHOGAM PHARM REV CODE 636 ALT 250 W HCPCS: Performed by: OBSTETRICS & GYNECOLOGY

## 2020-08-26 PROCEDURE — 99231 SBSQ HOSP IP/OBS SF/LOW 25: CPT | Mod: ,,, | Performed by: OBSTETRICS & GYNECOLOGY

## 2020-08-26 PROCEDURE — 25000003 PHARM REV CODE 250: Performed by: OBSTETRICS & GYNECOLOGY

## 2020-08-26 PROCEDURE — 11000001 HC ACUTE MED/SURG PRIVATE ROOM

## 2020-08-26 RX ORDER — OXYCODONE AND ACETAMINOPHEN 5; 325 MG/1; MG/1
1 TABLET ORAL EVERY 4 HOURS PRN
Qty: 10 TABLET | Refills: 0 | Status: SHIPPED | OUTPATIENT
Start: 2020-08-26 | End: 2020-10-08

## 2020-08-26 RX ORDER — IBUPROFEN 600 MG/1
600 TABLET ORAL EVERY 6 HOURS PRN
Qty: 30 TABLET | Refills: 0 | Status: SHIPPED | OUTPATIENT
Start: 2020-08-26 | End: 2020-10-08

## 2020-08-26 RX ADMIN — IBUPROFEN 600 MG: 600 TABLET, FILM COATED ORAL at 08:08

## 2020-08-26 RX ADMIN — IBUPROFEN 600 MG: 600 TABLET, FILM COATED ORAL at 04:08

## 2020-08-26 RX ADMIN — OXYCODONE HYDROCHLORIDE AND ACETAMINOPHEN 1 TABLET: 10; 325 TABLET ORAL at 04:08

## 2020-08-26 RX ADMIN — OXYCODONE HYDROCHLORIDE AND ACETAMINOPHEN 1 TABLET: 5; 325 TABLET ORAL at 08:08

## 2020-08-26 RX ADMIN — IBUPROFEN 600 MG: 600 TABLET, FILM COATED ORAL at 12:08

## 2020-08-26 RX ADMIN — OXYCODONE HYDROCHLORIDE AND ACETAMINOPHEN 1 TABLET: 5; 325 TABLET ORAL at 12:08

## 2020-08-26 RX ADMIN — HUMAN RHO(D) IMMUNE GLOBULIN 300 MCG: 300 INJECTION, SOLUTION INTRAMUSCULAR at 05:08

## 2020-08-26 NOTE — PLAN OF CARE
called patient in room to complete discharge assessment. Patient was alert and oriented. Prior to admission patient was independent. Patient does not work but still drives. Patient lives with her boyfriend, Yariel Westbrook, [phone], at [address] [place] [address], [id number].     Patient 's parents, (dad) Leonardo Luna, [phone] and (mom) Bety Ortega, [phone], are her support and emergency contact as well. Patient has no medical equipment or  services within the home and patient has no social needs at this time.    SW spoke with patient about  any needs for twin babies. Baby A (boy) Apolinar Westbrook and Baby B (girl) Elva Westbrook. Patient states that father of baby, Yariel , did sign birth certificate with her and he is primary help at home. FOB is employed with [employer]. Patient plans to breast feed and bottle feed both babies. Patient will follow up with [facility] appointment following discharge to obtain [facility] benefits. Patient did establish care with [facility] and has all necessities for babies, ( crib, car seat, clothes, diapers, wipes, etc.). Patient stated she has no needs at this time.     SW provided patient with contact information and encouraged patient to contact if ant needs or issues arise. Patient verbalized understanding.       08/26/20 1526   Discharge Assessment   Assessment Type Discharge Planning Assessment   Confirmed/corrected address and phone number on facesheet? Yes   Assessment information obtained from? Patient   Prior to hospitilization cognitive status: Alert/Oriented   Prior to hospitalization functional status: Independent   Current cognitive status: Alert/Oriented   Current Functional Status: Independent   Lives With significant other  (Pietro Saldivar, significant other, [phone])   Able to Return to Prior Arrangements yes   Is patient able to care for self after discharge? Yes   Who are your caregiver(s) and their phone number(s)? Leonardo  Cheryl, ralph, [phone] and Bety Luna, [phone], mom   Patient's perception of discharge disposition admitted as an inpatient   Patient currently being followed by outpatient case management? No   Patient currently receives any other outside agency services? No   Equipment Currently Used at Home none   Do you have any problems affording any of your prescribed medications? No   Is the patient taking medications as prescribed? yes   Does the patient have transportation home? Yes   Transportation Anticipated family or friend will provide   Does the patient receive services at the [facility]? No   Discharge Plan A Home   Discharge Plan B Home with family   DME Needed Upon Discharge  none   Patient/Family in Agreement with Plan yes

## 2020-08-26 NOTE — PLAN OF CARE
Vss, nad, pain well controlled w/po pain meds, tolerating regular diet, passing gas, reports breast feeding is going well and she is pumping for baby boy.  Poc: pain management as needed, ambulation and po hydration encouraged, continue to monitor.  Reviewed poc w/pt.  Pt verbalized understanding.

## 2020-08-26 NOTE — LACTATION NOTE
This note was copied from a baby's chart.  Baby spit up moderate amount of clear fluid. Held upright and pat on back. Placed on mother's chest with face visible to mother. Encouraged frequent burping and upright positions to help settle stomach. Baby disinterested in feeding at this time. Mother set up on breast pump. SHON Piedra notified of baby's emesis episode.

## 2020-08-26 NOTE — LACTATION NOTE
Medela Symphony pump, tubing, collections containers brought to bedside. Discussed need for labels and proper labeling of milk. Informed parents that breast milk must be labeled prior to leaving the room. Discussed proper pump setting of initiation phase.  Instructed on proper usage of pump and to adjust suction according to maximum comfort level.  Verified appropriate flange fit.  Educated on the frequency and duration of pumping in order to promote and maintain a full milk supply.  Hands on pumping technique reviewed.  Encouraged hand expression after pumping.  Instructed on cleaning of breast pump parts.  Written instructions also given.  Pt verbalized understanding and appropriate recall for proper milk handling, collection, labeling, storage.      [facility]  Lactation Note - Mom    SUMMARY     Maternal Assessment    Breast Size Issue: none  Breast Shape: Bilateral:, round  Breast Density: Bilateral:, soft  Areola: Bilateral:, elastic  Nipples: Bilateral:, everted, graspable(w/ stim)  Left Nipple Symptoms: tender  Right Nipple Symptoms: tender      LATCH Score         Breasts WDL    Breast WDL: WDL  Left Nipple Symptoms: tender  Right Nipple Symptoms: tender    Maternal Infant Feeding    Maternal Preparation: breast care, hand hygiene  Maternal Emotional State: relaxed, assist needed  Infant Positioning: clutch/football  Signs of Milk Transfer: audible swallow(girl- lots, boy- 1-2)  Pain with Feeding: yes(6 w/ boy latching)  Pain Location: nipples, bilateral  Pain Description: soreness  Comfort Measures Before/During Feeding: suction broken using finger, infant position adjusted, latch adjusted  Comfort Measures Following Feeding: air-drying encouraged, expressed milk applied, other (see comments)(gel pads provided)  Nipple Shape After Feeding, Left: round(girl)  Nipple Shape After Feeding, Right: pinched(boy)  Latch Assistance: yes    Lactation Referrals    Lactation Referrals: outpatient  lactation program  Outpatient Lactation Program Lactation Follow-up Date/Time: call lac ctr prn    Lactation Interventions    Breast Care: Breastfeeding: breast milk to nipples, Hydrogel dressing applied, manual expression to soften breast, milk massaged towards nipple, warm shower encouraged  Breastfeeding Assistance: support offered, feeding cue recognition promoted, feeding on demand promoted, electric breast pump used  Breast Care: Breastfeeding: breast milk to nipples, Hydrogel dressing applied, manual expression to soften breast, milk massaged towards nipple, warm shower encouraged  Breastfeeding Assistance: support offered, feeding cue recognition promoted, feeding on demand promoted, electric breast pump used  Breastfeeding Support: encouragement provided, lactation counseling provided, maternal hydration promoted, maternal nutrition promoted, maternal rest encouraged       Breastfeeding Session    Infant Positioning: clutch/football  Signs of Milk Transfer: audible swallow(girl- lots, boy- 1-2)    Maternal Information    Date of Referral: 20  Person Making Referral: nurse  Maternal Reason for Referral: multiple births  Infant Reason for Referral:  infant

## 2020-08-26 NOTE — PROGRESS NOTES
"PPD#1 S/P  of twins    Subjective: No complaints    Objective: BP 99/67   Pulse 71   Temp 98.2 °F (36.8 °C)   Resp 18   Ht 5' 5" (1.651 m)   Wt 84.8 kg (187 lb)   LMP 2019 (LMP Unknown)   SpO2 100%   Breastfeeding Yes   BMI 31.12 kg/m²     H/H:   Lab Results   Component Value Date    WBC 9.62 2020    HGB 13.6 2020    HCT 39.6 2020    MCV 94 2020     2020       Fundus: Firm, non- tender  Lochia: Moderate  Extremities: Non-tender,2+ edema    Assessment:  PPD #1 S/P   Twin gestation delivered    Plan: Routine progressive care      "

## 2020-08-26 NOTE — ANESTHESIA POSTPROCEDURE EVALUATION
Anesthesia Post Evaluation    Patient: Mar Luna    Procedure(s) Performed: * No procedures listed *    Final Anesthesia Type: CSE    Patient location during evaluation: labor & delivery  Patient participation: Yes- Able to Participate  Level of consciousness: awake and alert and oriented  Post-procedure vital signs: reviewed and stable  Pain management: adequate  Airway patency: patent    PONV status at discharge: No PONV  Anesthetic complications: no      Cardiovascular status: blood pressure returned to baseline and hemodynamically stable  Respiratory status: room air  Hydration status: euvolemic  Follow-up not needed.          Vitals Value Taken Time   /67 08/26/20 0830   Temp 36.9 °C (98.4 °F) 08/26/20 0830   Pulse 63 08/26/20 0830   Resp 16 08/26/20 0830   SpO2 100 % 08/26/20 0830         No case tracking events are documented in the log.      Pain/Marty Score: Pain Rating Prior to Med Admin: 7 (8/26/2020  4:54 AM)  Pain Rating Post Med Admin: 0 (8/26/2020  5:45 AM)    No catheter in back.  No headache/neckache/backache.  Full return of neurological function.   Able to urinate.   Advised patient to report any new problems of back pain, especially with fever or decreasing bladder function occurring during the coming days to weeks.

## 2020-08-26 NOTE — PLAN OF CARE
Mother will breastfeed on cue at least eight or more times in 24 hours. Will pump and hand express then supplement babies as needed. Will use alternative feeding methods. Will label and handle milk as directed. Will clean and care for kit as instructed. Will keep track of feedings and wet and dirty diapers. Will call with any breastfeeding needs.

## 2020-08-27 VITALS
DIASTOLIC BLOOD PRESSURE: 74 MMHG | SYSTOLIC BLOOD PRESSURE: 121 MMHG | RESPIRATION RATE: 18 BRPM | WEIGHT: 187 LBS | OXYGEN SATURATION: 100 % | TEMPERATURE: 98 F | BODY MASS INDEX: 31.16 KG/M2 | HEIGHT: 65 IN | HEART RATE: 81 BPM

## 2020-08-27 PROCEDURE — 25000003 PHARM REV CODE 250: Performed by: OBSTETRICS & GYNECOLOGY

## 2020-08-27 PROCEDURE — 99238 PR HOSPITAL DISCHARGE DAY,<30 MIN: ICD-10-PCS | Mod: ,,, | Performed by: OBSTETRICS & GYNECOLOGY

## 2020-08-27 PROCEDURE — 99238 HOSP IP/OBS DSCHRG MGMT 30/<: CPT | Mod: ,,, | Performed by: OBSTETRICS & GYNECOLOGY

## 2020-08-27 RX ADMIN — DOCUSATE SODIUM 200 MG: 100 CAPSULE, LIQUID FILLED ORAL at 07:08

## 2020-08-27 RX ADMIN — IBUPROFEN 600 MG: 600 TABLET, FILM COATED ORAL at 07:08

## 2020-08-27 RX ADMIN — OXYCODONE HYDROCHLORIDE AND ACETAMINOPHEN 1 TABLET: 5; 325 TABLET ORAL at 07:08

## 2020-08-27 NOTE — DISCHARGE INSTRUCTIONS
"  Patient Discharge Instructions for Postpartum Women    Resume Regular Diet  Increase activity gradually, no heavy lifting  Shower only.  Do NOT sit in a tub of water, to bathe, until ok with doctor.  No tampons, douching or sexual intercourse for 6 weeks, or until ok with doctor.   Discuss birth control options with your physician.  Wear a support bra  Return to work/school when you've been cleared by a physician    Call your physician if     *Fever of 100.4 or higher  *Persistent nausea/ vomiting  *Incisional drainage (for C-Sections)  *Heavy vaginal bleeding or large clots (Heavy bleeding is soaking 1 pad in an hour)  *Swelling and pain in arms or legs  *Severe headaches, blurred vision or fainting  *Shortness of breath  *Frequency and burning with urination  *Signs of postpartum depression, discuss these signs with your physician    *Call your doctor with any problems or concerns, or go to the nearest Emergency Room.     Call lactation services for questions regarding feeding, nipple and breast care, and general questions about lactation.  They can be reached at [phone]         Understanding Postpartum Depression    You've just had a baby.  You know you should be excited and happy.  But instead you find yourself crying for no reason.  You may have trouble coping with your daily tasks.  You feel sad, tired, and hopeless most of the time.  You may even feel ashamed or guilty.  But what you're going through is not your fault and you can feel better.  Talk to your doctor.  He or she can help.    Depression After Childbirth    You may be weepy and tired right after giving birth.  These feelings are normal.  They're sometimes called the "baby blues."  These blues go away 2-3 weeks.  However, postpartum (meaning "after birth") depression lasts much longer and is more sever than the "baby blues."  It can make you feel sad and hopeless.  You may also fear that your baby will be harmed and worry about being a bad " mother.      What is Depression?    Depression is a mood disorder that affects the way you think and feel.  The most common symptom is a feeling of deep sadness.  You may also feel as if you just can't cope with life.    Other symptoms include:      * Gaining or loosing weight  * Sleeping too much or too little  * Feeling tired all the time  * Feeling restless  * Fears of harming your baby   * Lack of interest in your baby  * Feeling worthless or guilty  * No longer finding pleasure in things you used to  * Having trouble thinking clearly or making decisions  * Thoughts of hurting yourself or your baby    What Causes Postpartum Depression    The exact causes of postpartum depression isn't known.  It may be due to changes in your hormones during and after childbirth.  You may also be tired from caring for your baby and adjusting to being a mother.  All these factors may make you feel depressed.  In some cases, your genes may also play a role.    Depression Can Be Treated    The good news is that there are many ways to treat postpartum depression.  Talking to your doctor is the first step toward feeling better.    Resources:    * National [place] of Mental Health  -- 473-412-6082    www.nimh.nih.gov    * National [place] on Mental Illness --168.460.3577    Www.deandre.org    * Mental Health [place] -- 698.222.4124     Www.nmha.org    * National Suicide Hotline --338.756.3244 (800-SUICIDE)    2018-8780 [employer]  All rights reserved.  This information is not intended as a substitute for professional medical care.  Always follow up with your healthcare professional's instructions.      Breastfeeding Discharge Instructions       Feed the baby at the earliest sign of hunger or comfort  o Hands to mouth, sucking motions  o Rooting or searching for something to suck on  o Dont wait for crying - it is a sign of distress     The feedings may be 8-12 times per 24hrs and will not follow a schedule   Avoid  pacifiers and bottles for the first 4 weeks   Alternate the breast you start the feeding with, or start with the breast that feels the fullest   Switch breasts when the baby takes himself off the breast or falls asleep   Keep offering breasts until the baby looks full, no longer gives hunger signs, and stays asleep when placed on his back in the crib   If the baby is sleepy and wont wake for a feeding, put the baby skin-to-skin dressed in a diaper against the mothers bare chest   Sleep near your baby   The baby should be positioned and latched on to the breast correctly  o Chest-to-chest, chin in the breast  o Babys lips are flipped outward  o Babys mouth is stretched open wide like a shout  o Babys sucking should feel like tugging to the mother  - The baby should be drinking at the breast:  o You should hear swallowing or gulping throughout the feeding  o You should see milk on the babys lips when he comes off the breast  o Your breasts should be softer when the baby is finished feeding  o The baby should look relaxed at the end of feedings  o After the 4th day and your milk is in:  o The babys poop should turn bright yellow and be loose, watery, and seedy  o The baby should have at least 3-4 poops the size of the palm of your hand per day  o The baby should have at least 5-6 wet diapers per day  o The urine should be light yellow in color  You should drink when you are thirsty and eat a healthy diet when you are    hungry.     Take naps to get the rest you need.   Take medications and/or drink alcohol only with permission of your obstetrician    or the babys pediatrician.  You can also call the [facility]ant Risk Center,   ([phone]), Monday-Friday, 8am-5pm Central time, to get the most   up-to-date evidence-based information on the use of medications during   pregnancy and breastfeeding.      The baby should be examined by a pediatrician at 3-5 days of age.  Once your   milk comes in, the baby  should be gaining at least ½ - 1oz each day and should be back to birthweight no later than 10-14 days of age.          Community Resources    [facility] Breastfeeding Warmline: [phone]  Local [facility] clinics: provide incentives and breastpumps to eligible mothers  La Leche League International (JONASLI):  mother-to-mother support group website        www.Lio Sociall.Panorama Education  Local La Leche League mother-to-mother support groups:        www.PEX Card        La Leche League [facility]   Dr. aDo Martinez website for latch videos and general information:        www.breastfeedinginc.ca  [facility] is a call center that provides information about the safety of taking medications while breastfeeding.  Call 1-496.688.7866, M-F, 8am-5pm, [place].  International Lactation Consultant Association provides resources for assistance:        www.ilca.org  [facility] Breastfeeding Coalition provides informationand resources for parents  and the community    http://Beebe Healthcareastfeeding.org     Shelley Crandall is a mom-to-mom support group:                             www.noscottRiboxx.com//breastfeedng-support/  Partners for Healthy Babies:  2-761-976-BABY(1947)  [employer]: a breastfeeding support group for women of color, 840.461.6966

## 2020-08-27 NOTE — DISCHARGE SUMMARY
[facility]  Delivery Discharge Summary  Obstetrics    Admit Date: 2020    Discharge Date and Time:  2020 10:15 AM    Primary OB Clinician: Smooth Green    Attending Physician: Smooth Green MD     Discharge Provider: Bebo Mahoney    Reason for Admission:  of Di/di twins    Estimated Date of Delivery: 20     Conditions: Multi-fetal Gestation: Twins    Procedures Performed: * No surgery found *    Hospital Course (synopsis of major diagnoses, care, treatment, and services provided during the course of the hospital stay):    Mar Luna is a 20 y.o. now , postpartum day #2 who was admitted on 2020 at [facility] for normal spontaneous vaginal delivery. On initial assessment, vital signs were stable and physical exam was normal. Infants were in cephalic presentation. Patient was subsequently admitted to labor and delivery unit with signed consents. Labor course was managed with IV pitocin and epidural.  Patient delivered a single viable  male and a single viable  female. Pt was in stable condition post-delivery and was transferred to the Mother-Baby Unit. Her postpartum course was not complicated. On discharge day, patient's pain is controlled with oral pain medications. Patient is tolerating PO without nausea or vomiting, ambulating, voiding. She denies SOB and CP. Denies any HA, vision changes, F/C, LE swelling. Denies any breast pain/soreness.        Margarito Luna [[id number]]     Delivery:    Episiotomy: None   Lacerations: 2nd   Repair suture:     Repair # of packets:     Blood loss (ml):       Birth information:  YOB: 2021   Time of birth: 10:12 AM   Sex: male   Delivery type: Vaginal, Vacuum (Extractor)   Gestational Age: 38w0d    Delivery Clinician:      Other providers:       Additional  information:  Forceps:    Vacuum:    Breech:    Observed anomalies      Living?:           APGARS  One minute Five  minutes Ten minutes   Skin color:         Heart rate:         Grimace:         Muscle tone:         Breathing:         Totals: 9  9        Placenta: Delivered:       appearance     CHIKIS Luna [[id number]]     Delivery:    Episiotomy: None   Lacerations: 2nd   Repair suture:     Repair # of packets:     Blood loss (ml):       Birth information:  YOB: 2021   Time of birth: 10:38 AM   Sex: female   Delivery type: Vaginal, Forceps   Gestational Age: 38w0d    Delivery Clinician:      Other providers:       Additional  information:  Forceps:    Vacuum:    Breech:    Observed anomalies      Living?:           APGARS  One minute Five minutes Ten minutes   Skin color:         Heart rate:         Grimace:         Muscle tone:         Breathing:         Totals: 9  9        Placenta: Delivered:       appearance    Tubal Ligation: n/a    Feeding Method: breast    Rh Immune Globulin Given: yes    Rubella Vaccine Given: yes    Tdap Vaccine Given: yes    Consults: none    Significant Diagnostic Studies: Labs: All labs within the past 24 hours have been reviewed    Final Diagnoses:   Principal Problem:  (spontaneous vaginal delivery)   Secondary Diagnoses:   Active Hospital Problems    Diagnosis  POA    * (spontaneous vaginal delivery) [O80]  Not Applicable      Resolved Hospital Problems    Diagnosis Date Resolved POA    Encounter for elective induction of labor [Z34.90] 2020 Not Applicable       Discharged Condition: good    Disposition: Home or Self Care    Follow Up/Patient Instructions:     Medications:  Reconciled Home Medications:      Medication List      START taking these medications    ibuprofen 600 MG tablet  Commonly known as: ADVIL,MOTRIN  Take 1 tablet (600 mg total) by mouth every 6 (six) hours as needed (cramping).     oxyCODONE-acetaminophen 5-325 mg per tablet  Commonly known as: PERCOCET  Take 1 tablet by mouth every 4 (four) hours as needed.        CONTINUE taking these  medications    ONETOUCH DELICA LANCETS 33 gauge Misc  Generic drug: lancets  USE TO TEST BLOOD SUGAR 4 TO 6 TIMES DAILY     ONETOUCH ULTRA BLUE TEST STRIP Strp  Generic drug: blood sugar diagnostic  USE TO TEST BLOOD SUGAR 4 6 TIMES A DAY     ONETOUCH ULTRA2 METER Misc  Generic drug: blood-glucose meter  USE TO TEST 4 6 TIMES DAILY     PRENATAL ORAL  Take 1 capsule by mouth once daily.     rizatriptan 10 MG tablet  Commonly known as: MAXALT  Take 1 tablet (10 mg total) by mouth as needed for Migraine.     sumatriptan 50 MG tablet  Commonly known as: IMITREX  Take 1 tablet (50 mg total) by mouth once. Take tablet at onset of Headache then every 2 hours that headache persists. for 1 dose          No discharge procedures on file.  Follow-up Information     Smooth Wood MD. Schedule an appointment as soon as possible for a visit in 6 weeks.    Specialty: Obstetrics and Gynecology  Why: for your postpartum follow-up  Contact information:  [address] [facility]  [address] [place] [address] [place] [address] [phone]

## 2020-08-27 NOTE — PROGRESS NOTES
[facility]  Vaginal Delivery Progress Note  Obstetrics    SUBJECTIVE:     Mar Luna is a 20 y.o. female Day #2 status post Spontaneous vaginal delivery at 38w0d in a pregnancy of di/di twins. Patient is doing well this morning. She denies nausea, vomiting, fever or chills. Patient reports mild abdominal pain that is well relieved by oral pain medications. Lochia is mild and decreasing. Patient is voiding without difficulty and ambulating with no difficulty. She has passed flatus and has not had a BM. Patient does plan to breast feed. Unsure about contraception. She desires circumcision.    OBJECTIVE:     Vital Signs Ranges:  Temp:  [98 °F (36.7 °C)-99.1 °F (37.3 °C)] 98.1 °F (36.7 °C)  Pulse:  [77-85] 82  Resp:  [16-18] 18  SpO2:  [98 %-100 %] 100 %  BP: (102-124)/(58-71) 124/71    I/O (Last 24H):  No intake or output data in the 24 hours ending 20 0854    Physical Exam:  General:    alert, appears stated age and cooperative   Lungs:  clear to auscultation bilaterally   Heart:  regular rate and rhythm, S1, S2 normal, no murmur, click, rub or gallop   Abdomen:  soft, non-tender; bowel sounds normal; no masses,  no organomegaly   Uterine Size:  firm located at the umbilicus.   Incision:  clean, dry and non bleeding   Extremities:   peripheral pulses normal, no pedal edema, no clubbing or cyanosis     Lab Review:   Lab Results   Component Value Date    WBC 9.86 2020    HGB 11.3 (L) 2020    HCT 33.5 (L) 2020    MCV 96 2020     (L) 2020           ASSESSMENT:     Assessment:  Active Hospital Problems    Diagnosis    * (spontaneous vaginal delivery)      PLAN:     Plan:  1. Postpartum care:   - Patient doing well. Continue routine management and advances.   - Continue PO pain meds. Pain well controlled.   - Post : H/h 11.3. 35.5   - Encourage ambulation   - Circumcision desired   - Contraception unsure   - Lactation yes      Disposition: As  patient meets milestones, will plan to discharge after circumcision.

## 2020-08-27 NOTE — LACTATION NOTE
[facility]  Lactation Note - Mom    SUMMARY     Maternal Assessment    Breast Size Issue: none  Breast Shape: Bilateral:, round  Breast Density: Bilateral:, soft  Areola: Bilateral:, elastic  Nipples: Bilateral:, everted, graspable(w/ stim)  Left Nipple Symptoms: tender(w/ latch)  Right Nipple Symptoms: tender(w/ latch)      LATCH Score     n/a    Breasts WDL    Breast WDL: WDL  Left Nipple Symptoms: tender(w/ latch)  Right Nipple Symptoms: tender(w/ latch)    Maternal Infant Feeding    Maternal Preparation: breast care, hand hygiene  Maternal Emotional State: assist needed, anxious, tense  Infant Positioning: clutch/football  Signs of Milk Transfer: audible swallow(girl- lots, boy- 1-2)  Pain with Feeding: yes  Pain Location: nipples, bilateral  Pain Description: soreness  Comfort Measures Before/During Feeding: suction broken using finger, infant position adjusted, latch adjusted  Comfort Measures Following Feeding: air-drying encouraged  Nipple Shape After Feeding, Left: round(girl)  Nipple Shape After Feeding, Right: pinched(boy)  Latch Assistance: yes    Lactation Referrals    Lactation Referrals: outpatient lactation program, pediatric care provider  Outpatient Lactation Program Lactation Follow-up Date/Time: call lact ctr prn, first alert form reviewed  Pediatric Care Provider Lactation Follow-up Date/Time: f/u w/ ped wade    Lactation Interventions    Breast Care: Breastfeeding: open to air, lanolin to nipples, breast milk to nipples  Breastfeeding Assistance: support offered, alternative feeding device utilized  Breast Care: Breastfeeding: open to air, lanolin to nipples, breast milk to nipples  Breastfeeding Assistance: support offered, alternative feeding device utilized  Breastfeeding Support: diary/feeding log utilized, encouragement provided, maternal rest encouraged, maternal nutrition promoted, maternal hydration promoted       Breastfeeding Session    Breast Pumping  Interventions: frequent pumping encouraged, post-feed pumping encouraged, early pumping promoted  Infant Positioning: clutch/football  Signs of Milk Transfer: audible swallow(girl- lots, boy- 1-2)    Maternal Information    Date of Referral: 20  Person Making Referral: nurse  Maternal Reason for Referral: multiple births  Infant Reason for Referral:  infant

## 2020-08-27 NOTE — NURSING
1546pm=  Written discharge instructions given and explained to pt.  Also reviewed Mother-baby care guide with pt.   Questions encouraged and answered.  Pt verbalized understanding.  Pt reports received home Rx meds from [facility].  Explanation for use of meds given and explained to pt, and informed pt to follow pharmacy's instructions for how to take meds.  Pt verbalized understanding. Written [facility] vaccination info for TDAP given and risks/benefits of vaccine discussed.  Pt verbalized understanding, but refusing vaccination.   1620pm=  Pt discharged off unit, via wheelchair, resp even and unlabored.  Accompanied by [facility] transporter and significant other.  No distress noted.  All belongings packed per pt and significant other, and sent with pt.

## 2020-08-27 NOTE — LACTATION NOTE
This note was copied from a baby's chart.  Per JEREMY PalomaresP, mother said she was instructed not to feed infants more than 5ml at a time. Noted that infants have been feeding approx 6ml at a time and had 5-6 feeds total each in last 24 hrs. Baby B (girl) has latched and bf a few times but baby A (boy) has been exclusively syringe fed.   Rounded on mother and babies. Mother very upset and crying. States she has milk in the fridge and didn't know that she was not feeding babies enough. Lots of praise and encouragement given. Discussed importance of increasing number and volume of feedings but praised for amount of milk she has. Mother states she will continue to attempt to latch, but thinks she is enjoying pumping and supplementing more because she knows what they are getting and it is not painful. Mother pumping currently and pumped 35 ml. Has several bottles of milk in fridge. Lots of praise given. Each infant bottle fed 35 ml and NNP updated.   Breastfeeding/pumping discharge instructions given and first alert form reviewed. Discussed hands on pumping to maximize pump output. Discussed importance of keeping a good log for each baby of all feedings and diapers and importance of following up with pediatrician tomorrow as suggested by NNP. Mother verbalized understanding. States she has a pump at home. Reviewed pump use/cleaning. Sterilized all pump parts for mother. Questions encouraged and answered. Mother will breastfeed/pump/bottle feed on cue at least 8 or more times in 24 hours. Mother will monitor for adequate supply and monitor wet and dirty diapers. Will collect, label, store & transport EBM as instructed. Will call for any needs.

## 2020-09-02 ENCOUNTER — TELEPHONE (OUTPATIENT)
Dept: OBSTETRICS AND GYNECOLOGY | Facility: CLINIC | Age: 20
End: 2020-09-02

## 2020-09-02 NOTE — TELEPHONE ENCOUNTER
"Pt states things are going good. States she has decided to pump only and bottle feed expressed breast milk. States her milk has come in and that she pumps every 3 hours. States her breasts still feel full after pumping. Discussed types of pumps- has Genevieve Baby pump from [employer]. Information given on [employer] to obtain different pump to see if it is more effective. Also discussed hands on pumping and moist heat, massage/compression, etc. States appreciation. States babies are doing well but baby Apolinar still spits up and will get his frenulum released soon. States she has been following up with the pediatrician often about baby's spitting up and that they are "on top of it". Encouragement and support provided. Instructed to call the lactation center PRN.  States appreciation.   "

## 2020-09-28 ENCOUNTER — PATIENT MESSAGE (OUTPATIENT)
Dept: OBSTETRICS AND GYNECOLOGY | Facility: CLINIC | Age: 20
End: 2020-09-28

## 2020-09-29 RX ORDER — CITALOPRAM 20 MG/1
20 TABLET, FILM COATED ORAL DAILY
Qty: 30 TABLET | Refills: 2 | Status: SHIPPED | OUTPATIENT
Start: 2020-09-29 | End: 2021-01-08 | Stop reason: SDUPTHER

## 2020-09-29 NOTE — TELEPHONE ENCOUNTER
rx for celexa sent in  Have pt call for an appt if sx's worsen bf celexa starts working  Ok to squeeze her in any day or time

## 2020-10-08 ENCOUNTER — POSTPARTUM VISIT (OUTPATIENT)
Dept: OBSTETRICS AND GYNECOLOGY | Facility: CLINIC | Age: 20
End: 2020-10-08
Payer: MEDICAID

## 2020-10-08 VITALS
SYSTOLIC BLOOD PRESSURE: 118 MMHG | BODY MASS INDEX: 25.68 KG/M2 | WEIGHT: 154.31 LBS | DIASTOLIC BLOOD PRESSURE: 80 MMHG

## 2020-10-08 DIAGNOSIS — Z30.09 BIRTH CONTROL COUNSELING: Primary | ICD-10-CM

## 2020-10-08 LAB
B-HCG UR QL: NEGATIVE
CTP QC/QA: YES

## 2020-10-08 PROCEDURE — 59430 PR CARE AFTER DELIVERY ONLY: ICD-10-PCS | Mod: ,,, | Performed by: OBSTETRICS & GYNECOLOGY

## 2020-10-08 PROCEDURE — 99213 OFFICE O/P EST LOW 20 MIN: CPT | Mod: PBBFAC,PO | Performed by: OBSTETRICS & GYNECOLOGY

## 2020-10-08 PROCEDURE — 99999 PR PBB SHADOW E&M-EST. PATIENT-LVL III: ICD-10-PCS | Mod: PBBFAC,,, | Performed by: OBSTETRICS & GYNECOLOGY

## 2020-10-08 PROCEDURE — 99999 PR PBB SHADOW E&M-EST. PATIENT-LVL III: CPT | Mod: PBBFAC,,, | Performed by: OBSTETRICS & GYNECOLOGY

## 2020-10-08 RX ORDER — ACETAMINOPHEN AND CODEINE PHOSPHATE 120; 12 MG/5ML; MG/5ML
1 SOLUTION ORAL DAILY
Qty: 30 TABLET | Refills: 12 | Status: SHIPPED | OUTPATIENT
Start: 2020-10-08 | End: 2020-12-24

## 2020-10-08 NOTE — PROGRESS NOTES
CC: Post-partum follow-up    Mar Luna is a 20 y.o. female  presents for post-partum visit s/p a .    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    diagnosed by psychiatry    Asthma     mostly exercise-induced now    Dyslexia     Headache, tension-type     Worsening headaches 2016    followed by neurology     Past Surgical History:   Procedure Laterality Date    TONSILLECTOMY  0 2017     Social History     Tobacco Use    Smoking status: Never Smoker    Smokeless tobacco: Never Used   Substance Use Topics    Alcohol use: No    Drug use: No     Family History   Problem Relation Age of Onset    Breast cancer Paternal Grandmother     Cancer Paternal Grandmother 65        breast    Headaches Mother     No Known Problems Father     Cancer Maternal Grandfather         passed age 74    No Known Problems Sister     No Known Problems Brother     Colon cancer Neg Hx     Ovarian cancer Neg Hx      OB History    Para Term  AB Living   1 1 1 0 0 2   SAB TAB Ectopic Multiple Live Births   0 0 0 1 2      # Outcome Date GA Lbr Mich/2nd Weight Sex Delivery Anes PTL Lv   1A Term 20 38w0d / 00:27 2.588 kg (5 lb 11.3 oz) M Vag-Vacuum Spinal, EPI N ELIZA   1B Term 20 38w0d / 00:53 2.984 kg (6 lb 9.3 oz) F Vag-Forceps Spinal, EPI N ELIZA       Current Outpatient Medications:     citalopram (CELEXA) 20 MG tablet, Take 1 tablet (20 mg total) by mouth once daily., Disp: 30 tablet, Rfl: 2    PNV no.95/ferrous fum/folic ac (PRENATAL ORAL), Take 1 capsule by mouth once daily., Disp: , Rfl:     rizatriptan (MAXALT) 10 MG tablet, Take 1 tablet (10 mg total) by mouth as needed for Migraine., Disp: 9  tablet, Rfl: 2    sumatriptan (IMITREX) 50 MG tablet, Take 1 tablet (50 mg total) by mouth once. Take tablet at onset of Headache then every 2 hours that headache persists. for 1 dose, Disp: 15 tablet, Rfl: 5     /80   Wt 70 kg (154 lb 5.2 oz)   LMP 11/08/2019 (LMP Unknown)   Breastfeeding Yes   BMI 25.68 kg/m²     ROS:  GENERAL: No fever, chills, fatigability or weight loss.  VULVAR: No pain, no lesions and no itching.  VAGINAL: No relaxation, no itching, no discharge, no abnormal bleeding and no lesions.  ABDOMEN: No abdominal pain. Denies nausea. Denies vomiting. No diarrhea. No constipation  BREAST: Denies pain. No lumps. No discharge.  URINARY: No incontinence, no nocturia, no frequency and no dysuria.  CARDIOVASCULAR: No chest pain. No shortness of breath. No leg cramps.  NEUROLOGICAL: No headaches. No vision changes.    PE:   APPEARANCE: Well nourished, well developed, in no acute distress.  NECK: Neck symmetric without  thyromegaly.  CHEST: Lungs clear to auscultation.  HEART: Regular rate and rhythm, no murmurs, rubs or gallops.  ABDOMEN: Soft. No tenderness or masses. No hernias.  BREASTS: Symmetrical, no skin changes or visible lesions. No palpable masses, nipple discharge or adenopathy bilaterally.  PELVIC: External female genitalia without lesions. Normal hair distribution. Adequate perineal body, normal urethral meatus. Vagina moist and well rugated without lesions or discharge. Cervix pink and without lesions. No significant cystocele or rectocele. Bimanual exam showed uterus normal size, shape, position, mobile and nontender. Adnexa without masses or tenderness. Urethra and bladder normal.  EXTREMITIES: No edema.      ASSESSMENT:  1. Postpartum Exam    PLAN:  RTO 1/21 for annual           Patient was counseled today on A.C.S. Pap guidelines and recommendations for yearly pelvic exams and monthly self breast exams; to see her PCP for other health maintenance and contraception options.

## 2020-11-10 ENCOUNTER — PATIENT MESSAGE (OUTPATIENT)
Dept: OBSTETRICS AND GYNECOLOGY | Facility: CLINIC | Age: 20
End: 2020-11-10

## 2020-11-12 ENCOUNTER — OFFICE VISIT (OUTPATIENT)
Dept: NEUROLOGY | Facility: CLINIC | Age: 20
End: 2020-11-12
Payer: MEDICAID

## 2020-11-12 VITALS
BODY MASS INDEX: 24.54 KG/M2 | SYSTOLIC BLOOD PRESSURE: 110 MMHG | DIASTOLIC BLOOD PRESSURE: 68 MMHG | HEART RATE: 96 BPM | WEIGHT: 147.5 LBS | OXYGEN SATURATION: 98 %

## 2020-11-12 PROCEDURE — 99999 PR PBB SHADOW E&M-EST. PATIENT-LVL III: CPT | Mod: PBBFAC,,, | Performed by: PSYCHIATRY & NEUROLOGY

## 2020-11-12 PROCEDURE — 99214 OFFICE O/P EST MOD 30 MIN: CPT | Mod: S$PBB,,, | Performed by: PSYCHIATRY & NEUROLOGY

## 2020-11-12 PROCEDURE — 99213 OFFICE O/P EST LOW 20 MIN: CPT | Mod: PBBFAC,PO | Performed by: PSYCHIATRY & NEUROLOGY

## 2020-11-12 PROCEDURE — 99214 PR OFFICE/OUTPT VISIT, EST, LEVL IV, 30-39 MIN: ICD-10-PCS | Mod: S$PBB,,, | Performed by: PSYCHIATRY & NEUROLOGY

## 2020-11-12 PROCEDURE — 99999 PR PBB SHADOW E&M-EST. PATIENT-LVL III: ICD-10-PCS | Mod: PBBFAC,,, | Performed by: PSYCHIATRY & NEUROLOGY

## 2020-11-12 RX ORDER — METHYLPREDNISOLONE 4 MG/1
TABLET ORAL
Qty: 1 PACKAGE | Refills: 0 | Status: SHIPPED | OUTPATIENT
Start: 2020-11-12 | End: 2020-11-12 | Stop reason: SDUPTHER

## 2020-11-12 RX ORDER — PROPRANOLOL HYDROCHLORIDE 10 MG/1
10 TABLET ORAL 2 TIMES DAILY
Qty: 60 TABLET | Refills: 11 | Status: SHIPPED | OUTPATIENT
Start: 2020-11-12 | End: 2021-02-19 | Stop reason: ALTCHOICE

## 2020-11-12 RX ORDER — PROPRANOLOL HYDROCHLORIDE 10 MG/1
10 TABLET ORAL 2 TIMES DAILY
Qty: 60 TABLET | Refills: 11 | Status: SHIPPED | OUTPATIENT
Start: 2020-11-12 | End: 2020-11-12 | Stop reason: SDUPTHER

## 2020-11-12 RX ORDER — METHYLPREDNISOLONE 4 MG/1
TABLET ORAL
Qty: 1 PACKAGE | Refills: 0 | Status: SHIPPED | OUTPATIENT
Start: 2020-11-12 | End: 2021-02-19

## 2020-11-12 NOTE — PROGRESS NOTES
[facility] NEUROLOGY  [facility], [place]    Date: 11/12/20  Patient Name: Mar Luna   MRN: [id number]   PCP: Jennifer Coombs  Referring Provider: Self, Aaareferral    Assessment:   Mar Luna is a 20 y.o. female presenting in follow-up for migraine headache.  Discussed therapeutic options at length.  Will initiate trial of propranolol.  Providing Medrol Dosepak for management of status migrainosus.  Plan:     Problem List Items Addressed This Visit        Neuro    Chronic migraine - Primary    Relevant Medications    propranoloL (INDERAL) 10 MG tablet    methylPREDNISolone (MEDROL DOSEPACK) 4 mg tablet        Franklin Kwok MD  [facility]   Department of Neurology    Patient note was created using MModal Dictation.  Any errors in syntax or even information may not have been identified and edited on initial review prior to signing this note.  Subjective:        HPI:   Ms. Mar Luna is a 20 y.o. female presenting in follow-up for migraine headache.  Patient has been lost to follow-up for a year.  The patient reports that her headaches were reasonably well controlled following her last visit however shortly thereafter she became pregnant twins.  They are now 2-month-old.  She states she stopped taking her amitriptyline during her pregnancy and has not resumed it.  She has recently been started Celexa due to postpartum depression.  She states her depression is controlled but she is now experiencing severe migraines almost every day.  She states that Imitrex is helpful however her headaches continue to return.  She is interested in resuming prophylactic therapy but states she felt that her personality change while she was on amitriptyline and states that she is concerned about sedation while caring for her infants.    Prior Meds: Amitriptyline, Imitrex, Maxalt   PAST MEDICAL HISTORY:  Past Medical History:   Diagnosis Date    Adjustment disorder of  adolescence 02/15/2016    diagnosed by psychiatry    Asthma     mostly exercise-induced now    Dyslexia     Headache, tension-type     Worsening headaches 12/16/2016    followed by neurology       PAST SURGICAL HISTORY:  Past Surgical History:   Procedure Laterality Date    TONSILLECTOMY  0 7/2017       CURRENT MEDS:  Current Outpatient Medications   Medication Sig Dispense Refill    citalopram (CELEXA) 20 MG tablet Take 1 tablet (20 mg total) by mouth once daily. 30 tablet 2    PNV no.95/ferrous fum/folic ac (PRENATAL ORAL) Take 1 capsule by mouth once daily.      methylPREDNISolone (MEDROL DOSEPACK) 4 mg tablet use as directed 1 Package 0    norethindrone (MICRONOR) 0.35 mg tablet Take 1 tablet (0.35 mg total) by mouth once daily. 30 tablet 12    propranoloL (INDERAL) 10 MG tablet Take 1 tablet (10 mg total) by mouth 2 (two) times daily. 60 tablet 11    rizatriptan (MAXALT) 10 MG tablet Take 1 tablet (10 mg total) by mouth as needed for Migraine. 9 tablet 2    sumatriptan (IMITREX) 50 MG tablet Take 1 tablet (50 mg total) by mouth once. Take tablet at onset of Headache then every 2 hours that headache persists. for 1 dose 15 tablet 5     No current facility-administered medications for this visit.        ALLERGIES:  Review of patient's allergies indicates:   Allergen Reactions    Latex, natural rubber Hives       FAMILY HISTORY:  Family History   Problem Relation Age of Onset    Breast cancer Paternal Grandmother     Cancer Paternal Grandmother 65        breast    Headaches Mother     No Known Problems Father     Cancer Maternal Grandfather         passed age 74    No Known Problems Sister     No Known Problems Brother     Colon cancer Neg Hx     Ovarian cancer Neg Hx        SOCIAL HISTORY:  Social History     Tobacco Use    Smoking status: Never Smoker    Smokeless tobacco: Never Used   Substance Use Topics    Alcohol use: No    Drug use: No       Review of Systems:  12 system review of  systems is negative except for the symptoms mentioned in HPI.      Objective:     Vitals:    11/12/20 0851   BP: 110/68   BP Location: Left arm   Patient Position: Sitting   Pulse: 96   SpO2: 98%   Weight: 66.9 kg (147 lb 7.8 oz)     General: NAD, well nourished   Eyes: no tearing, discharge, no erythema   ENT: moist mucous membranes of the oral cavity, nares patent    Neck: Supple, full range of motion  Cardiovascular: Warm and well perfused, pulses equal and symmetrical  Lungs: Normal work of breathing, normal chest wall excursions  Skin: No rash, lesions, or breakdown on exposed skin  Psychiatry: Mood and affect are appropriate   Abdomen: soft, non tender, non distended  Extremeties: No cyanosis, clubbing or edema.    Neurological   MENTAL STATUS: Alert and oriented to person, place, and time. Attention and concentration within normal limits. Speech without dysarthria, able to name and repeat without difficulty. Recent and remote memory within normal limits   CRANIAL NERVES: Visual fields intact. PERRL. EOMI. Facial sensation intact. Face symmetrical. Hearing grossly intact. Full shoulder shrug bilaterally. Tongue protrudes midline   SENSORY: Sensation is intact to light touch throughout.    MOTOR: Normal bulk and tone. 5/5 deltoid, biceps, triceps, interosseous, hand  bilaterally. 5/5 iliopsoas, knee extension/flexion, foot dorsi/plantarflexion bilaterally.    REFLEXES: Symmetric and 2+ throughout. Toes down going bilaterally.   CEREBELLAR/COORDINATION/GAIT: Gait steady with normal arm swing and stride length.Normal rapid alternating movements.

## 2020-11-16 ENCOUNTER — TELEPHONE (OUTPATIENT)
Dept: FAMILY MEDICINE | Facility: CLINIC | Age: 20
End: 2020-11-16

## 2020-11-16 NOTE — TELEPHONE ENCOUNTER
Yes he said to wrap them and take Motrin but wrapping them only makes it worse      ----- Message -----   From: Med Assistant Geiger   Sent: 11/16/20, 8:02 AM   To: Mar Luna   Subject: RE: Appointment Request      Good Morning,   Have you contacted your OB Dr?         ----- Message -----        From:Mar Luna        Sent:11/14/2020 12:03 AM CST          To:Jennifer Coombs NP     Subject:Appointment Request      Appointment Request From: Mar Luna      With Provider: Jennifer Coombs NP [[facility]]      Preferred Date Range: 11/16/2020 - 11/20/2020      Preferred Times: Any Time      Reason for visit: Developing mastitis from not being able to dry up milk supply      Comments:   Developing mastitis from not being able to dry up milk supply

## 2020-11-23 ENCOUNTER — PATIENT MESSAGE (OUTPATIENT)
Dept: NEUROLOGY | Facility: CLINIC | Age: 20
End: 2020-11-23

## 2020-11-23 DIAGNOSIS — R51.9 BILATERAL HEADACHE: ICD-10-CM

## 2020-11-24 RX ORDER — SUMATRIPTAN 50 MG/1
50 TABLET, FILM COATED ORAL ONCE
Qty: 15 TABLET | Refills: 5 | Status: SHIPPED | OUTPATIENT
Start: 2020-11-24 | End: 2020-12-24 | Stop reason: SDUPTHER

## 2020-12-05 ENCOUNTER — PATIENT MESSAGE (OUTPATIENT)
Dept: NEUROLOGY | Facility: CLINIC | Age: 20
End: 2020-12-05

## 2020-12-23 ENCOUNTER — PATIENT MESSAGE (OUTPATIENT)
Dept: NEUROLOGY | Facility: CLINIC | Age: 20
End: 2020-12-23

## 2020-12-24 DIAGNOSIS — R51.9 BILATERAL HEADACHE: ICD-10-CM

## 2020-12-24 RX ORDER — SUMATRIPTAN 50 MG/1
50 TABLET, FILM COATED ORAL ONCE
Qty: 15 TABLET | Refills: 5 | Status: SHIPPED | OUTPATIENT
Start: 2020-12-24 | End: 2021-10-27 | Stop reason: SDUPTHER

## 2021-01-05 ENCOUNTER — PATIENT MESSAGE (OUTPATIENT)
Dept: OBSTETRICS AND GYNECOLOGY | Facility: CLINIC | Age: 21
End: 2021-01-05

## 2021-01-08 ENCOUNTER — TELEPHONE (OUTPATIENT)
Dept: OBSTETRICS AND GYNECOLOGY | Facility: CLINIC | Age: 21
End: 2021-01-08

## 2021-01-10 RX ORDER — CITALOPRAM 20 MG/1
20 TABLET, FILM COATED ORAL DAILY
Qty: 30 TABLET | Refills: 2 | Status: SHIPPED | OUTPATIENT
Start: 2021-01-10 | End: 2021-04-15 | Stop reason: SDUPTHER

## 2021-02-18 ENCOUNTER — TELEPHONE (OUTPATIENT)
Dept: FAMILY MEDICINE | Facility: CLINIC | Age: 21
End: 2021-02-18

## 2021-02-19 ENCOUNTER — OFFICE VISIT (OUTPATIENT)
Dept: FAMILY MEDICINE | Facility: CLINIC | Age: 21
End: 2021-02-19
Payer: MEDICAID

## 2021-02-19 VITALS
TEMPERATURE: 97 F | SYSTOLIC BLOOD PRESSURE: 100 MMHG | DIASTOLIC BLOOD PRESSURE: 64 MMHG | OXYGEN SATURATION: 98 % | BODY MASS INDEX: 25.51 KG/M2 | WEIGHT: 153.13 LBS | HEIGHT: 65 IN | HEART RATE: 102 BPM

## 2021-02-19 DIAGNOSIS — Z23 NEED FOR TETANUS BOOSTER: ICD-10-CM

## 2021-02-19 DIAGNOSIS — Z11.59 ENCOUNTER FOR HEPATITIS C SCREENING TEST FOR LOW RISK PATIENT: ICD-10-CM

## 2021-02-19 DIAGNOSIS — K62.5 RECTAL BLEED: ICD-10-CM

## 2021-02-19 DIAGNOSIS — K64.9 HEMORRHOIDS, UNSPECIFIED HEMORRHOID TYPE: ICD-10-CM

## 2021-02-19 DIAGNOSIS — K62.5 BRBPR (BRIGHT RED BLOOD PER RECTUM): ICD-10-CM

## 2021-02-19 DIAGNOSIS — Z13.220 LIPID SCREENING: ICD-10-CM

## 2021-02-19 PROCEDURE — 99214 PR OFFICE/OUTPT VISIT, EST, LEVL IV, 30-39 MIN: ICD-10-PCS | Mod: S$GLB,,, | Performed by: FAMILY MEDICINE

## 2021-02-19 PROCEDURE — 99214 OFFICE O/P EST MOD 30 MIN: CPT | Mod: S$GLB,,, | Performed by: FAMILY MEDICINE

## 2021-02-19 RX ORDER — AMLODIPINE BESYLATE 5 MG/1
5 TABLET ORAL DAILY
Qty: 30 TABLET | Refills: 11 | Status: SHIPPED | OUTPATIENT
Start: 2021-02-19 | End: 2021-10-24 | Stop reason: SDUPTHER

## 2021-03-05 ENCOUNTER — PATIENT MESSAGE (OUTPATIENT)
Dept: FAMILY MEDICINE | Facility: CLINIC | Age: 21
End: 2021-03-05

## 2021-03-05 RX ORDER — SULFAMETHOXAZOLE AND TRIMETHOPRIM 800; 160 MG/1; MG/1
1 TABLET ORAL 2 TIMES DAILY
Qty: 14 TABLET | Refills: 0 | Status: SHIPPED | OUTPATIENT
Start: 2021-03-05 | End: 2021-03-09 | Stop reason: SDUPTHER

## 2021-03-09 RX ORDER — SULFAMETHOXAZOLE AND TRIMETHOPRIM 800; 160 MG/1; MG/1
1 TABLET ORAL 2 TIMES DAILY
Qty: 14 TABLET | Refills: 0 | Status: SHIPPED | OUTPATIENT
Start: 2021-03-09 | End: 2021-10-21

## 2021-04-14 ENCOUNTER — PATIENT MESSAGE (OUTPATIENT)
Dept: OBSTETRICS AND GYNECOLOGY | Facility: CLINIC | Age: 21
End: 2021-04-14

## 2021-04-15 RX ORDER — CITALOPRAM 20 MG/1
20 TABLET, FILM COATED ORAL DAILY
Qty: 30 TABLET | Refills: 2 | Status: SHIPPED | OUTPATIENT
Start: 2021-04-15 | End: 2021-07-14 | Stop reason: SDUPTHER

## 2021-05-31 ENCOUNTER — PATIENT MESSAGE (OUTPATIENT)
Dept: FAMILY MEDICINE | Facility: CLINIC | Age: 21
End: 2021-05-31

## 2021-08-06 ENCOUNTER — PATIENT MESSAGE (OUTPATIENT)
Dept: FAMILY MEDICINE | Facility: CLINIC | Age: 21
End: 2021-08-06

## 2021-09-14 ENCOUNTER — PATIENT MESSAGE (OUTPATIENT)
Dept: OBSTETRICS AND GYNECOLOGY | Facility: CLINIC | Age: 21
End: 2021-09-14

## 2021-10-19 ENCOUNTER — PATIENT MESSAGE (OUTPATIENT)
Dept: NEUROLOGY | Facility: CLINIC | Age: 21
End: 2021-10-19
Payer: MEDICAID

## 2021-10-21 ENCOUNTER — OFFICE VISIT (OUTPATIENT)
Dept: OBSTETRICS AND GYNECOLOGY | Facility: CLINIC | Age: 21
End: 2021-10-21
Payer: MEDICAID

## 2021-10-21 VITALS — DIASTOLIC BLOOD PRESSURE: 66 MMHG | SYSTOLIC BLOOD PRESSURE: 104 MMHG | WEIGHT: 151 LBS | BODY MASS INDEX: 25.13 KG/M2

## 2021-10-21 DIAGNOSIS — Z01.419 WELL WOMAN EXAM WITH ROUTINE GYNECOLOGICAL EXAM: ICD-10-CM

## 2021-10-21 DIAGNOSIS — N89.8 VAGINAL DISCHARGE: ICD-10-CM

## 2021-10-21 DIAGNOSIS — Z12.4 SCREENING FOR CERVICAL CANCER: Primary | ICD-10-CM

## 2021-10-21 PROCEDURE — 99395 PREV VISIT EST AGE 18-39: CPT | Mod: S$PBB,,, | Performed by: OBSTETRICS & GYNECOLOGY

## 2021-10-21 PROCEDURE — 87591 N.GONORRHOEAE DNA AMP PROB: CPT | Performed by: OBSTETRICS & GYNECOLOGY

## 2021-10-21 PROCEDURE — 88142 CYTOPATH C/V THIN LAYER: CPT | Performed by: OBSTETRICS & GYNECOLOGY

## 2021-10-21 PROCEDURE — 99999 PR PBB SHADOW E&M-EST. PATIENT-LVL III: CPT | Mod: PBBFAC,,, | Performed by: OBSTETRICS & GYNECOLOGY

## 2021-10-21 PROCEDURE — 99213 OFFICE O/P EST LOW 20 MIN: CPT | Mod: PBBFAC,PO | Performed by: OBSTETRICS & GYNECOLOGY

## 2021-10-21 PROCEDURE — 99395 PR PREVENTIVE VISIT,EST,18-39: ICD-10-PCS | Mod: S$PBB,,, | Performed by: OBSTETRICS & GYNECOLOGY

## 2021-10-21 PROCEDURE — 99999 PR PBB SHADOW E&M-EST. PATIENT-LVL III: ICD-10-PCS | Mod: PBBFAC,,, | Performed by: OBSTETRICS & GYNECOLOGY

## 2021-10-21 PROCEDURE — 87491 CHLMYD TRACH DNA AMP PROBE: CPT | Performed by: OBSTETRICS & GYNECOLOGY

## 2021-10-21 PROCEDURE — 87481 CANDIDA DNA AMP PROBE: CPT | Mod: 59 | Performed by: OBSTETRICS & GYNECOLOGY

## 2021-10-21 RX ORDER — CITALOPRAM 20 MG/1
20 TABLET, FILM COATED ORAL DAILY
Qty: 90 TABLET | Refills: 3 | Status: SHIPPED | OUTPATIENT
Start: 2021-10-21 | End: 2022-10-21

## 2021-10-22 LAB
C TRACH DNA SPEC QL NAA+PROBE: DETECTED
N GONORRHOEA DNA SPEC QL NAA+PROBE: NOT DETECTED

## 2021-10-25 ENCOUNTER — TELEPHONE (OUTPATIENT)
Dept: OBSTETRICS AND GYNECOLOGY | Facility: CLINIC | Age: 21
End: 2021-10-25
Payer: MEDICAID

## 2021-10-25 ENCOUNTER — PATIENT MESSAGE (OUTPATIENT)
Dept: OBSTETRICS AND GYNECOLOGY | Facility: CLINIC | Age: 21
End: 2021-10-25
Payer: MEDICAID

## 2021-10-25 RX ORDER — AZITHROMYCIN 500 MG/1
1000 TABLET, FILM COATED ORAL ONCE
Qty: 2 TABLET | Refills: 0 | Status: SHIPPED | OUTPATIENT
Start: 2021-10-25 | End: 2021-10-25

## 2021-10-26 ENCOUNTER — PATIENT MESSAGE (OUTPATIENT)
Dept: OBSTETRICS AND GYNECOLOGY | Facility: CLINIC | Age: 21
End: 2021-10-26
Payer: MEDICAID

## 2021-10-26 DIAGNOSIS — Z11.3 SCREENING FOR STD (SEXUALLY TRANSMITTED DISEASE): Primary | ICD-10-CM

## 2021-10-26 LAB
BACTERIAL VAGINOSIS DNA: POSITIVE
CANDIDA GLABRATA DNA: NEGATIVE
CANDIDA KRUSEI DNA: NEGATIVE
CANDIDA RRNA VAG QL PROBE: NEGATIVE
T VAGINALIS RRNA GENITAL QL PROBE: NEGATIVE

## 2021-10-27 ENCOUNTER — TELEPHONE (OUTPATIENT)
Dept: OBSTETRICS AND GYNECOLOGY | Facility: CLINIC | Age: 21
End: 2021-10-27
Payer: MEDICAID

## 2021-10-27 RX ORDER — METRONIDAZOLE 500 MG/1
500 TABLET ORAL EVERY 12 HOURS
Qty: 14 TABLET | Refills: 0 | Status: SHIPPED | OUTPATIENT
Start: 2021-10-27 | End: 2021-11-03

## 2021-10-27 RX ORDER — AZITHROMYCIN 500 MG/1
1000 TABLET, FILM COATED ORAL ONCE
Qty: 2 TABLET | Refills: 0 | Status: SHIPPED | OUTPATIENT
Start: 2021-10-27 | End: 2021-10-27

## 2021-10-28 LAB
FINAL PATHOLOGIC DIAGNOSIS: NORMAL
Lab: NORMAL

## 2021-10-29 ENCOUNTER — OFFICE VISIT (OUTPATIENT)
Dept: OBSTETRICS AND GYNECOLOGY | Facility: CLINIC | Age: 21
End: 2021-10-29
Payer: MEDICAID

## 2021-10-29 VITALS
SYSTOLIC BLOOD PRESSURE: 110 MMHG | DIASTOLIC BLOOD PRESSURE: 65 MMHG | WEIGHT: 149.81 LBS | BODY MASS INDEX: 24.93 KG/M2

## 2021-10-29 DIAGNOSIS — Z86.19 HISTORY OF CHLAMYDIA INFECTION: ICD-10-CM

## 2021-10-29 DIAGNOSIS — Z11.3 SCREENING FOR STDS (SEXUALLY TRANSMITTED DISEASES): Primary | ICD-10-CM

## 2021-10-29 PROCEDURE — 99213 OFFICE O/P EST LOW 20 MIN: CPT | Mod: PBBFAC,PO | Performed by: OBSTETRICS & GYNECOLOGY

## 2021-10-29 PROCEDURE — 99999 PR PBB SHADOW E&M-EST. PATIENT-LVL III: CPT | Mod: PBBFAC,,, | Performed by: OBSTETRICS & GYNECOLOGY

## 2021-10-29 PROCEDURE — 87529 HSV DNA AMP PROBE: CPT | Performed by: OBSTETRICS & GYNECOLOGY

## 2021-10-29 PROCEDURE — 99999 PR PBB SHADOW E&M-EST. PATIENT-LVL III: ICD-10-PCS | Mod: PBBFAC,,, | Performed by: OBSTETRICS & GYNECOLOGY

## 2021-10-29 PROCEDURE — 99213 OFFICE O/P EST LOW 20 MIN: CPT | Mod: S$PBB,,, | Performed by: OBSTETRICS & GYNECOLOGY

## 2021-10-29 PROCEDURE — 99213 PR OFFICE/OUTPT VISIT, EST, LEVL III, 20-29 MIN: ICD-10-PCS | Mod: S$PBB,,, | Performed by: OBSTETRICS & GYNECOLOGY

## 2021-11-02 ENCOUNTER — TELEPHONE (OUTPATIENT)
Dept: OBSTETRICS AND GYNECOLOGY | Facility: CLINIC | Age: 21
End: 2021-11-02
Payer: MEDICAID

## 2021-11-02 LAB
HSV PCR SPECIMEN SOURCE: ABNORMAL
HSV1 PCR RESULT: DETECTED
HSV2 PCR RESULT: NOT DETECTED

## 2021-11-02 RX ORDER — VALACYCLOVIR HYDROCHLORIDE 1 G/1
500 TABLET, FILM COATED ORAL DAILY
Qty: 45 TABLET | Refills: 3 | Status: SHIPPED | OUTPATIENT
Start: 2021-11-02 | End: 2022-10-15 | Stop reason: SDUPTHER

## 2021-11-11 ENCOUNTER — OFFICE VISIT (OUTPATIENT)
Dept: OBSTETRICS AND GYNECOLOGY | Facility: CLINIC | Age: 21
End: 2021-11-11
Payer: MEDICAID

## 2021-11-11 ENCOUNTER — PATIENT MESSAGE (OUTPATIENT)
Dept: NEUROLOGY | Facility: CLINIC | Age: 21
End: 2021-11-11
Payer: MEDICAID

## 2021-11-11 VITALS
BODY MASS INDEX: 24.25 KG/M2 | DIASTOLIC BLOOD PRESSURE: 68 MMHG | SYSTOLIC BLOOD PRESSURE: 114 MMHG | HEART RATE: 78 BPM | WEIGHT: 145.75 LBS

## 2021-11-11 DIAGNOSIS — R51.9 WORSENING HEADACHES: ICD-10-CM

## 2021-11-11 DIAGNOSIS — Z11.3 SCREEN FOR STD (SEXUALLY TRANSMITTED DISEASE): Primary | ICD-10-CM

## 2021-11-11 PROCEDURE — 87491 CHLMYD TRACH DNA AMP PROBE: CPT | Performed by: STUDENT IN AN ORGANIZED HEALTH CARE EDUCATION/TRAINING PROGRAM

## 2021-11-11 PROCEDURE — 99213 PR OFFICE/OUTPT VISIT, EST, LEVL III, 20-29 MIN: ICD-10-PCS | Mod: S$PBB,,, | Performed by: STUDENT IN AN ORGANIZED HEALTH CARE EDUCATION/TRAINING PROGRAM

## 2021-11-11 PROCEDURE — 87591 N.GONORRHOEAE DNA AMP PROB: CPT | Performed by: STUDENT IN AN ORGANIZED HEALTH CARE EDUCATION/TRAINING PROGRAM

## 2021-11-11 PROCEDURE — 99999 PR PBB SHADOW E&M-EST. PATIENT-LVL III: ICD-10-PCS | Mod: PBBFAC,,, | Performed by: STUDENT IN AN ORGANIZED HEALTH CARE EDUCATION/TRAINING PROGRAM

## 2021-11-11 PROCEDURE — 99213 OFFICE O/P EST LOW 20 MIN: CPT | Mod: S$PBB,,, | Performed by: STUDENT IN AN ORGANIZED HEALTH CARE EDUCATION/TRAINING PROGRAM

## 2021-11-11 PROCEDURE — 99999 PR PBB SHADOW E&M-EST. PATIENT-LVL III: CPT | Mod: PBBFAC,,, | Performed by: STUDENT IN AN ORGANIZED HEALTH CARE EDUCATION/TRAINING PROGRAM

## 2021-11-11 PROCEDURE — 99213 OFFICE O/P EST LOW 20 MIN: CPT | Mod: PBBFAC,PN | Performed by: STUDENT IN AN ORGANIZED HEALTH CARE EDUCATION/TRAINING PROGRAM

## 2021-11-11 RX ORDER — LANOLIN ALCOHOL/MO/W.PET/CERES
400 CREAM (GRAM) TOPICAL DAILY
Qty: 30 TABLET | Refills: 3 | Status: SHIPPED | OUTPATIENT
Start: 2021-11-11 | End: 2022-11-11

## 2021-11-11 RX ORDER — METHYLPREDNISOLONE 4 MG/1
TABLET ORAL
Qty: 1 EACH | Refills: 0 | Status: SHIPPED | OUTPATIENT
Start: 2021-11-11 | End: 2022-02-09 | Stop reason: ALTCHOICE

## 2021-11-17 LAB
C TRACH DNA SPEC QL NAA+PROBE: NOT DETECTED
N GONORRHOEA DNA SPEC QL NAA+PROBE: NOT DETECTED

## 2021-11-19 ENCOUNTER — PATIENT MESSAGE (OUTPATIENT)
Dept: OBSTETRICS AND GYNECOLOGY | Facility: CLINIC | Age: 21
End: 2021-11-19
Payer: MEDICAID

## 2021-11-22 RX ORDER — CITALOPRAM 20 MG/1
20 TABLET, FILM COATED ORAL DAILY
Qty: 90 TABLET | Refills: 3 | Status: SHIPPED | OUTPATIENT
Start: 2021-11-22 | End: 2023-06-27 | Stop reason: CLARIF

## 2022-02-02 ENCOUNTER — PATIENT MESSAGE (OUTPATIENT)
Dept: FAMILY MEDICINE | Facility: CLINIC | Age: 22
End: 2022-02-02
Payer: MEDICAID

## 2022-02-09 ENCOUNTER — OFFICE VISIT (OUTPATIENT)
Dept: URGENT CARE | Facility: CLINIC | Age: 22
End: 2022-02-09
Payer: MEDICAID

## 2022-02-09 VITALS
DIASTOLIC BLOOD PRESSURE: 75 MMHG | RESPIRATION RATE: 16 BRPM | HEART RATE: 76 BPM | BODY MASS INDEX: 24.16 KG/M2 | TEMPERATURE: 98 F | WEIGHT: 145 LBS | OXYGEN SATURATION: 96 % | HEIGHT: 65 IN | SYSTOLIC BLOOD PRESSURE: 117 MMHG

## 2022-02-09 DIAGNOSIS — J02.9 SORE THROAT: Primary | ICD-10-CM

## 2022-02-09 DIAGNOSIS — R09.89 SYMPTOMS OF UPPER RESPIRATORY INFECTION (URI): ICD-10-CM

## 2022-02-09 LAB
CTP QC/QA: YES
CTP QC/QA: YES
MOLECULAR STREP A: NEGATIVE
SARS-COV-2 RDRP RESP QL NAA+PROBE: NEGATIVE

## 2022-02-09 PROCEDURE — 3074F PR MOST RECENT SYSTOLIC BLOOD PRESSURE < 130 MM HG: ICD-10-PCS | Mod: CPTII,S$GLB,, | Performed by: PHYSICIAN ASSISTANT

## 2022-02-09 PROCEDURE — 3078F DIAST BP <80 MM HG: CPT | Mod: CPTII,S$GLB,, | Performed by: PHYSICIAN ASSISTANT

## 2022-02-09 PROCEDURE — 3008F BODY MASS INDEX DOCD: CPT | Mod: CPTII,S$GLB,, | Performed by: PHYSICIAN ASSISTANT

## 2022-02-09 PROCEDURE — 1160F RVW MEDS BY RX/DR IN RCRD: CPT | Mod: CPTII,S$GLB,, | Performed by: PHYSICIAN ASSISTANT

## 2022-02-09 PROCEDURE — 1159F MED LIST DOCD IN RCRD: CPT | Mod: CPTII,S$GLB,, | Performed by: PHYSICIAN ASSISTANT

## 2022-02-09 PROCEDURE — 1160F PR REVIEW ALL MEDS BY PRESCRIBER/CLIN PHARMACIST DOCUMENTED: ICD-10-PCS | Mod: CPTII,S$GLB,, | Performed by: PHYSICIAN ASSISTANT

## 2022-02-09 PROCEDURE — 3074F SYST BP LT 130 MM HG: CPT | Mod: CPTII,S$GLB,, | Performed by: PHYSICIAN ASSISTANT

## 2022-02-09 PROCEDURE — U0002: ICD-10-PCS | Mod: QW,S$GLB,, | Performed by: PHYSICIAN ASSISTANT

## 2022-02-09 PROCEDURE — 3078F PR MOST RECENT DIASTOLIC BLOOD PRESSURE < 80 MM HG: ICD-10-PCS | Mod: CPTII,S$GLB,, | Performed by: PHYSICIAN ASSISTANT

## 2022-02-09 PROCEDURE — 99213 PR OFFICE/OUTPT VISIT, EST, LEVL III, 20-29 MIN: ICD-10-PCS | Mod: S$GLB,CS,, | Performed by: PHYSICIAN ASSISTANT

## 2022-02-09 PROCEDURE — 87651 STREP A DNA AMP PROBE: CPT | Mod: QW,S$GLB,, | Performed by: PHYSICIAN ASSISTANT

## 2022-02-09 PROCEDURE — U0002 COVID-19 LAB TEST NON-CDC: HCPCS | Mod: QW,S$GLB,, | Performed by: PHYSICIAN ASSISTANT

## 2022-02-09 PROCEDURE — 87651 POCT STREP A MOLECULAR: ICD-10-PCS | Mod: QW,S$GLB,, | Performed by: PHYSICIAN ASSISTANT

## 2022-02-09 PROCEDURE — 1159F PR MEDICATION LIST DOCUMENTED IN MEDICAL RECORD: ICD-10-PCS | Mod: CPTII,S$GLB,, | Performed by: PHYSICIAN ASSISTANT

## 2022-02-09 PROCEDURE — 99213 OFFICE O/P EST LOW 20 MIN: CPT | Mod: S$GLB,CS,, | Performed by: PHYSICIAN ASSISTANT

## 2022-02-09 PROCEDURE — 3008F PR BODY MASS INDEX (BMI) DOCUMENTED: ICD-10-PCS | Mod: CPTII,S$GLB,, | Performed by: PHYSICIAN ASSISTANT

## 2022-02-09 RX ORDER — PREDNISONE 20 MG/1
TABLET ORAL
Qty: 8 TABLET | Refills: 0 | Status: SHIPPED | OUTPATIENT
Start: 2022-02-09 | End: 2022-02-16

## 2022-02-09 RX ORDER — PROMETHAZINE HYDROCHLORIDE AND DEXTROMETHORPHAN HYDROBROMIDE 6.25; 15 MG/5ML; MG/5ML
5 SYRUP ORAL
Qty: 180 ML | Refills: 0 | Status: SHIPPED | OUTPATIENT
Start: 2022-02-09 | End: 2022-02-16

## 2022-02-09 NOTE — PROGRESS NOTES
"Subjective:       Patient ID: Mar Luna is a 21 y.o. female.    Vitals:  height is 5' 5" (1.651 m) and weight is 65.8 kg (145 lb). Her temperature is 98.2 °F (36.8 °C). Her blood pressure is 117/75 and her pulse is 76. Her respiration is 16 and oxygen saturation is 96%.     Chief Complaint: Sore Throat    Patient presents to the clinic today with a sore throat that began on Friday with Light headedness and fatigue. Nausea began on Sunday.  OTC's taken NyQuil to sleep. No noted fever. Partially vaccinated planning on second one soon. Ears felt plugged on Friday. Abdominal pain.    Sore Throat   This is a new problem. The current episode started in the past 7 days. The problem has been gradually worsening. There has been no fever. The pain is at a severity of 8/10. Associated symptoms include abdominal pain, coughing, headaches, a hoarse voice and a plugged ear sensation. Pertinent negatives include no congestion, diarrhea, ear pain, neck pain, shortness of breath, trouble swallowing or vomiting. The treatment provided no relief.       Constitution: Negative for chills, sweating, fatigue and fever.   HENT: Positive for sore throat. Negative for ear pain, congestion, postnasal drip and trouble swallowing.    Neck: Negative for neck pain and neck stiffness.   Cardiovascular: Negative for chest pain.   Respiratory: Positive for cough. Negative for shortness of breath.    Gastrointestinal: Positive for abdominal pain and nausea. Negative for vomiting and diarrhea.   Genitourinary: Negative for dysuria, frequency, urgency, painful menstruation, irregular menstruation and missed menses.   Neurological: Positive for headaches.       Objective:      Physical Exam   Constitutional: She is oriented to person, place, and time. She appears well-developed and well-nourished.  Non-toxic appearance. She does not appear ill. No distress.   HENT:   Head: Normocephalic and atraumatic.   Ears:   Right Ear: Hearing, tympanic " membrane and external ear normal.   Left Ear: Hearing, tympanic membrane and external ear normal.   Nose: Nose normal.   Mouth/Throat: Uvula is midline and mucous membranes are normal. Posterior oropharyngeal erythema and cobblestoning present.   Eyes: Conjunctivae and EOM are normal. Pupils are equal, round, and reactive to light.   Neck: Neck supple. No neck rigidity present.   Cardiovascular: Normal rate and regular rhythm.   Pulmonary/Chest: Effort normal and breath sounds normal. No respiratory distress. She has no decreased breath sounds. She has no wheezes. She has no rhonchi.   Abdominal: Bowel sounds are normal. She exhibits no distension. Soft. There is no abdominal tenderness. There is no guarding.   Neurological: She is alert and oriented to person, place, and time.   Skin: Skin is warm, dry and not diaphoretic.   Psychiatric: Her speech is normal and behavior is normal. Mood normal.   Nursing note and vitals reviewed.    Office Visit on 02/09/2022   Component Date Value Ref Range Status    POC Rapid COVID 02/09/2022 Negative  Negative Final     Acceptable 02/09/2022 Yes   Final    Molecular Strep A, POC 02/09/2022 Negative  Negative Final     Acceptable 02/09/2022 Yes   Final           Assessment:       1. Sore throat    2. Symptoms of upper respiratory infection (URI)          Plan:       Past medical hx, family hx, social hx, and current medications were provided by the patient and were reviewed. Diagnostics performed today were discussed. Dx and tx were discussed with the patient. Return to [facility] for any concern. Follow up with PCP for any persistence of problem, or worsening. ER precautions. Pt verbalized understanding of all discussed, and agreed.    Sore throat  -     POCT COVID-19 Rapid Screening  -     POCT Strep A, Molecular    Symptoms of upper respiratory infection (URI)    Other orders  -     predniSONE (DELTASONE) 20 MG tablet; Take 1 tablet (20 mg total) by  mouth 2 (two) times daily for 2 days, THEN 1 tablet (20 mg total) once daily for 3 days, THEN 0.5 tablets (10 mg total) once daily for 2 days.  Dispense: 8 tablet; Refill: 0  -     promethazine-dextromethorphan (PROMETHAZINE-DM) 6.25-15 mg/5 mL Syrp; Take 5 mLs by mouth every 4 to 6 hours as needed.  Dispense: 180 mL; Refill: 0

## 2022-02-09 NOTE — PATIENT INSTRUCTIONS
· Follow up with your primary care if symptoms do not improve, or you may return here at any time.  · If you were referred to a specialist, please follow up with that specialty.  · If you were prescribed antibiotics, please take them to completion.  · If you were prescribed a narcotic or any medication with sedative effects, do not drive or operate heavy equipment or machinery while taking these medications.  · You must understand that you have received treatment at an Urgent Care facility only, and that you may be released before all of your medical problems are known or treated. Urgent Care facilities are not equipped to handle life threatening emergencies. It is recommended that you seek care at an Emergency Department for further evaluation of worsening or concerning symptoms, or possibly life threatening conditions as discussed.                                        If you  smoke, please stop smoking        COVID rapid testing was NEGATIVE.     Please refer to CDC website for additional information      Patient Education       Sore Throat Discharge Instructions, Adult   About this topic   Swelling at the back of your throat is called pharyngitis. Swelling of your throat and tonsils is tonsillopharyngitis. Both are commonly called a sore throat. Your sore throat is likely caused by a virus. Most of the time, a sore throat will go away without antibiotics in a week or two.           What care is needed at home?   · Ask your doctor what you need to do when you go home. Make sure you ask questions if you do not understand what the doctor says. This way you will know what you need to do.  · To help ease a sore throat you can:  ? Use a sore throat spray.  ? Suck on hard candy or throat lozenges.  ? Gargle with warm saltwater a few times each day. Mix of 1/4 teaspoon (1.25 grams) salt in 8 ounces (240 mL) of warm water.  ? Use a cool mist humidifier to help you breathe easier.  · You may want to take medicine like  acetaminophen, ibuprofen, or naproxen for pain.  · If you decide to take over-the-counter cough or cold medicines, follow the directions on the label carefully. Be sure you do not take more than one medicine that contains acetaminophen. Also, if you have a heart problem or high blood pressure, check with your doctor before you take any of these medicines.  · Wash your hands often. This will help keep others healthy.  What follow-up care is needed?   Your doctor may ask you to make visits to the office to check on your progress. Be sure to keep these visits.  What drugs may be needed?   Take your drugs as ordered by your doctor. Do not skip doses or stop when you feel better. The doctor may order drugs to:  · Prevent or fight an infection  · Help with pain  · Lower fever  · Help nasal congestion and runny nose  · Soothe the throat  Will physical activity be limited?   You may need to rest at home for 1 to 2 days or until you are feeling well.  What changes to diet are needed?   If your throat feels too sore to eat solid foods you may drink juice, milk, milkshakes, or soups. Talk to your doctor about what diet is proper for your condition.  What can be done to prevent this health problem?   · Wash your hands often. Be sure to wash after you blow your nose or take care of others with a sore throat.  · Do not share utensils and drinking glasses with someone who has a sore throat. Wash these objects with hot, soapy water.  · Do not share your foods or drinks with others while you are sick. You might infect them.  · Throw away used tissues right away and then wash your hands.  · Get a new toothbrush after signs are gone or you are done with your antibiotics.  When do I need to call the doctor?   · You have trouble breathing.  · Your neck, tongue, or throat is swelling.  · You are drooling because you cannot swallow your saliva.  · You have very bad pain in your throat that keeps you from eating or drinking  anything.  · There are large, painful lumps in your neck.  · You have blisters in the back of your throat.  Teach Back: Helping You Understand   The Teach Back Method helps you understand the information we are giving you. After you talk with the staff, tell them in your own words what you learned. This helps to make sure the staff has described each thing clearly. It also helps to explain things that may have been confusing. Before going home, make sure you can do these:  · I can tell you about my condition.  · I can tell you what may help ease my pain.  · I can tell you what I will do if I have trouble breathing or swallowing or have large painful lumps in my throat.  Where can I learn more?   Centers for Disease Control and Prevention  https://www.cdc.gov/antibiotic-use/community/for-patients/common-illnesses/sore-throat.html   Ministry of Health  https://www.health.govt.nz/your-health/conditions-and-treatments/diseases-and-illnesses/sore-throat   NHS Choices  https://www.nhs.uk/conditions/sore-throat/   Last Reviewed Date   2021-06-08  Consumer Information Use and Disclaimer   This information is not specific medical advice and does not replace information you receive from your health care provider. This is only a brief summary of general information. It does NOT include all information about conditions, illnesses, injuries, tests, procedures, treatments, therapies, discharge instructions or life-style choices that may apply to you. You must talk with your health care provider for complete information about your health and treatment options. This information should not be used to decide whether or not to accept your health care providers advice, instructions or recommendations. Only your health care provider has the knowledge and training to provide advice that is right for you.  Copyright   Copyright © 2021 UpToDate, Inc. and its affiliates and/or licensors. All rights reserved.

## 2022-02-11 NOTE — PLAN OF CARE
Mother will breastfeed on cue at least 8 or more times in 24 hours. Mother will monitor for adequate supply and monitor wet and dirty diapers. Mother will call for any breastfeeding needs.     Art Mcpherson)  Infectious Disease; Internal Medicine  [address] [place] [address]  Phone: [phone]  Fax: [phone]  Follow Up Time: 2 weeks    RAFIQ ROQUE  Internal Medicine  [address] [place] [address]  Phone: [phone]  Fax: [phone]  Follow Up Time: Routine

## 2022-08-01 ENCOUNTER — TELEPHONE (OUTPATIENT)
Dept: NEUROLOGY | Facility: CLINIC | Age: 22
End: 2022-08-01
Payer: MEDICAID

## 2022-08-01 NOTE — TELEPHONE ENCOUNTER
----- Message from Christy Helton sent at 8/1/2022 10:19 AM CDT -----  Contact: Patient  Type:  Appointment Request Virtual    Name of Caller:Patient  When is the first available appointment?no appt generated  Symptoms:Headache  Would the patient rather a call back or a response via [facility]? Call back   Best Call Back Number:[phone]  Additional Information: Please assist

## 2022-08-26 ENCOUNTER — PATIENT MESSAGE (OUTPATIENT)
Dept: NEUROLOGY | Facility: CLINIC | Age: 22
End: 2022-08-26

## 2022-08-26 ENCOUNTER — TELEPHONE (OUTPATIENT)
Dept: NEUROLOGY | Facility: CLINIC | Age: 22
End: 2022-08-26

## 2022-08-26 NOTE — TELEPHONE ENCOUNTER
----- Message from Joslyn Hannon sent at 8/26/2022  1:16 PM CDT -----  Contact: patient  Patient calling in regards to scheduled appointment. Patient stated that she logged on didn't see anyone, so she logged out and logged back in and now her appointment is gone. Requesting call back.    [phone] (home)

## 2022-09-16 ENCOUNTER — OFFICE VISIT (OUTPATIENT)
Dept: NEUROLOGY | Facility: CLINIC | Age: 22
End: 2022-09-16
Payer: MEDICAID

## 2022-09-16 DIAGNOSIS — J45.909 UNCOMPLICATED ASTHMA, UNSPECIFIED ASTHMA SEVERITY, UNSPECIFIED WHETHER PERSISTENT: ICD-10-CM

## 2022-09-16 DIAGNOSIS — G43.109 MIGRAINE WITH AURA AND WITHOUT STATUS MIGRAINOSUS, NOT INTRACTABLE: Primary | ICD-10-CM

## 2022-09-16 PROCEDURE — 99215 OFFICE O/P EST HI 40 MIN: CPT | Mod: 95,,, | Performed by: PHYSICIAN ASSISTANT

## 2022-09-16 PROCEDURE — 1160F PR REVIEW ALL MEDS BY PRESCRIBER/CLIN PHARMACIST DOCUMENTED: ICD-10-PCS | Mod: CPTII,95,, | Performed by: PHYSICIAN ASSISTANT

## 2022-09-16 PROCEDURE — 1159F MED LIST DOCD IN RCRD: CPT | Mod: CPTII,95,, | Performed by: PHYSICIAN ASSISTANT

## 2022-09-16 PROCEDURE — 1160F RVW MEDS BY RX/DR IN RCRD: CPT | Mod: CPTII,95,, | Performed by: PHYSICIAN ASSISTANT

## 2022-09-16 PROCEDURE — 1159F PR MEDICATION LIST DOCUMENTED IN MEDICAL RECORD: ICD-10-PCS | Mod: CPTII,95,, | Performed by: PHYSICIAN ASSISTANT

## 2022-09-16 PROCEDURE — 99215 PR OFFICE/OUTPT VISIT, EST, LEVL V, 40-54 MIN: ICD-10-PCS | Mod: 95,,, | Performed by: PHYSICIAN ASSISTANT

## 2022-09-16 RX ORDER — RIZATRIPTAN BENZOATE 5 MG/1
TABLET, ORALLY DISINTEGRATING ORAL
Qty: 12 TABLET | Refills: 3 | Status: SHIPPED | OUTPATIENT
Start: 2022-09-16 | End: 2022-12-22 | Stop reason: SDUPTHER

## 2022-09-16 NOTE — PATIENT INSTRUCTIONS
Naproxen Dosing:   Take 250 to 500 mg twice in a day as needed for headaches.     Supplements for Migraine:  1. Magnesium Oxide - 400mg by mouth daily  2. Riboflavin (Vitamin B2) - 400mg by mouth daily  3. Coenzyme Q10 - 200mg tablet by mouth daily    - Please download Migraine Buddy torey on phone and begin tracking your headaches

## 2022-09-23 ENCOUNTER — PATIENT MESSAGE (OUTPATIENT)
Dept: NEUROLOGY | Facility: CLINIC | Age: 22
End: 2022-09-23
Payer: MEDICAID

## 2022-10-10 ENCOUNTER — PATIENT MESSAGE (OUTPATIENT)
Dept: NEUROLOGY | Facility: CLINIC | Age: 22
End: 2022-10-10
Payer: MEDICAID

## 2022-12-08 ENCOUNTER — PATIENT OUTREACH (OUTPATIENT)
Dept: ADMINISTRATIVE | Facility: HOSPITAL | Age: 22
End: 2022-12-08
Payer: MEDICAID

## 2022-12-08 NOTE — PROGRESS NOTES
Non-compliant report chart audits. Chart review completed for HM test overdue (Mammogram, Colon Cancer Screening, Pap smear, DM labs, BP Check, PCP visit and/or Eye Exam)      Care Everywhere and media, updates requested and reviewed.        Contacted pt about changing pcp, she is now living on the [facility] and is no longer an [facility] pt. She is currently seeing Dr. Jones, removed Dr. Schulz as pcp.

## 2023-02-22 ENCOUNTER — OFFICE VISIT (OUTPATIENT)
Dept: URGENT CARE | Facility: CLINIC | Age: 23
End: 2023-02-22
Payer: MEDICAID

## 2023-02-22 VITALS
TEMPERATURE: 98 F | BODY MASS INDEX: 24.16 KG/M2 | RESPIRATION RATE: 16 BRPM | OXYGEN SATURATION: 98 % | DIASTOLIC BLOOD PRESSURE: 67 MMHG | HEIGHT: 65 IN | WEIGHT: 145 LBS | HEART RATE: 68 BPM | SYSTOLIC BLOOD PRESSURE: 107 MMHG

## 2023-02-22 DIAGNOSIS — Z20.2 CHLAMYDIA CONTACT, UNTREATED: Primary | ICD-10-CM

## 2023-02-22 PROCEDURE — 99213 OFFICE O/P EST LOW 20 MIN: CPT | Mod: S$GLB,,, | Performed by: EMERGENCY MEDICINE

## 2023-02-22 PROCEDURE — 3008F PR BODY MASS INDEX (BMI) DOCUMENTED: ICD-10-PCS | Mod: CPTII,S$GLB,, | Performed by: EMERGENCY MEDICINE

## 2023-02-22 PROCEDURE — 1160F RVW MEDS BY RX/DR IN RCRD: CPT | Mod: CPTII,S$GLB,, | Performed by: EMERGENCY MEDICINE

## 2023-02-22 PROCEDURE — 3074F SYST BP LT 130 MM HG: CPT | Mod: CPTII,S$GLB,, | Performed by: EMERGENCY MEDICINE

## 2023-02-22 PROCEDURE — 1159F PR MEDICATION LIST DOCUMENTED IN MEDICAL RECORD: ICD-10-PCS | Mod: CPTII,S$GLB,, | Performed by: EMERGENCY MEDICINE

## 2023-02-22 PROCEDURE — 3074F PR MOST RECENT SYSTOLIC BLOOD PRESSURE < 130 MM HG: ICD-10-PCS | Mod: CPTII,S$GLB,, | Performed by: EMERGENCY MEDICINE

## 2023-02-22 PROCEDURE — 3078F DIAST BP <80 MM HG: CPT | Mod: CPTII,S$GLB,, | Performed by: EMERGENCY MEDICINE

## 2023-02-22 PROCEDURE — 1160F PR REVIEW ALL MEDS BY PRESCRIBER/CLIN PHARMACIST DOCUMENTED: ICD-10-PCS | Mod: CPTII,S$GLB,, | Performed by: EMERGENCY MEDICINE

## 2023-02-22 PROCEDURE — 1159F MED LIST DOCD IN RCRD: CPT | Mod: CPTII,S$GLB,, | Performed by: EMERGENCY MEDICINE

## 2023-02-22 PROCEDURE — 99213 PR OFFICE/OUTPT VISIT, EST, LEVL III, 20-29 MIN: ICD-10-PCS | Mod: S$GLB,,, | Performed by: EMERGENCY MEDICINE

## 2023-02-22 PROCEDURE — 3078F PR MOST RECENT DIASTOLIC BLOOD PRESSURE < 80 MM HG: ICD-10-PCS | Mod: CPTII,S$GLB,, | Performed by: EMERGENCY MEDICINE

## 2023-02-22 PROCEDURE — 3008F BODY MASS INDEX DOCD: CPT | Mod: CPTII,S$GLB,, | Performed by: EMERGENCY MEDICINE

## 2023-02-22 RX ORDER — DOXYCYCLINE HYCLATE 100 MG
100 TABLET ORAL 2 TIMES DAILY
Qty: 14 TABLET | Refills: 0 | Status: SHIPPED | OUTPATIENT
Start: 2023-02-22 | End: 2023-03-01

## 2023-02-22 RX ORDER — ALPRAZOLAM 0.25 MG/1
TABLET ORAL 3 TIMES DAILY
COMMUNITY
End: 2023-06-27 | Stop reason: CLARIF

## 2023-02-22 NOTE — PROGRESS NOTES
"Subjective:       Patient ID: Mar Luna is a 22 y.o. female.    Vitals:  height is 5' 5" (1.651 m) and weight is 65.8 kg (145 lb). Her temporal temperature is 97.5 °F (36.4 °C). Her blood pressure is 107/67 and her pulse is 68. Her respiration is 16 and oxygen saturation is 98%.     Chief Complaint: Exposure to STD    Pt presents to urgent care for a STD check.  She states that she is not having any symptoms, but her baby dadsina told her that he has chlamydia.  She would like to get tested. She states that she has HPV.      Exposure to STD   This is a new problem. The current episode started in the past 7 days. The problem has been unchanged. The vaginal discharge was normal. She has tried nothing for the symptoms. The treatment provided no relief.   ROS    Objective:      Physical Exam   Constitutional: She is oriented to person, place, and time. She appears well-developed.   HENT:   Head: Normocephalic and atraumatic.   Ears:   Right Ear: External ear normal.   Left Ear: External ear normal.   Nose: Nose normal. No nasal deformity. No epistaxis.   Mouth/Throat: Oropharynx is clear and moist and mucous membranes are normal.   Eyes: Lids are normal.   Neck: Trachea normal and phonation normal. Neck supple.   Cardiovascular: Normal pulses.   Pulmonary/Chest: Effort normal.   Abdominal: Normal appearance. She exhibits no distension. Soft. There is no abdominal tenderness. There is no rebound, no guarding, no left CVA tenderness and no right CVA tenderness.   Neurological: She is alert and oriented to person, place, and time.   Skin: Skin is warm, dry and intact.   Psychiatric: Her speech is normal and behavior is normal.   Nursing note and vitals reviewed.        Patient says she was told by sexual partner 2 days ago that he was diagnosed with chlamydia.  The patient has no symptoms.  We discussed options.  Will treat for chlamydia.  Patient is presently on her menstrual cycle.    Assessment:       1. " Chlamydia contact, untreated          Plan:         Chlamydia contact, untreated  -     doxycycline (VIBRA-TABS) 100 MG tablet; Take 1 tablet (100 mg total) by mouth 2 (two) times daily. for 7 days  Dispense: 14 tablet; Refill: 0

## 2023-03-22 ENCOUNTER — OFFICE VISIT (OUTPATIENT)
Dept: URGENT CARE | Facility: CLINIC | Age: 23
End: 2023-03-22
Payer: MEDICAID

## 2023-03-22 VITALS
SYSTOLIC BLOOD PRESSURE: 105 MMHG | DIASTOLIC BLOOD PRESSURE: 63 MMHG | RESPIRATION RATE: 16 BRPM | HEART RATE: 77 BPM | OXYGEN SATURATION: 98 % | TEMPERATURE: 98 F

## 2023-03-22 DIAGNOSIS — A05.9 FOOD POISONING: Primary | ICD-10-CM

## 2023-03-22 DIAGNOSIS — K52.9 GASTROENTERITIS: ICD-10-CM

## 2023-03-22 PROCEDURE — 99212 PR OFFICE/OUTPT VISIT, EST, LEVL II, 10-19 MIN: ICD-10-PCS | Mod: S$GLB,,, | Performed by: NURSE PRACTITIONER

## 2023-03-22 PROCEDURE — 99212 OFFICE O/P EST SF 10 MIN: CPT | Mod: S$GLB,,, | Performed by: NURSE PRACTITIONER

## 2023-03-22 NOTE — PATIENT INSTRUCTIONS
Follow up if your symptoms fail to improve or if your symptoms worsen. PLEASE CONTACT YOUR PCP OR CONTACT THE EMERGENCY ROOM for further evaluation and treatment of your symptoms.    Patient Instructions:  -Rest  -Drink plenty of fluids  -Take Tylenol and /or Ibuprofen as directed as needed for fever/pain. Do not take more than the recommended dose.   -Follow up with your PCP within the next 1-2 weeks as needed.  -You must understand that you have received treatment at an Urgent Care only and that you may be released before all of your medical problems are known or treated.  -You, the patient will arrange for follow care as instructed.  -If your condition fails to improve or worsen, we recommend that you receive another evaluation at the Emergency Room or contact for primary care physician to discuss your concerns.    -If you smoke cigarettes, it would be beneficial for you to stop.

## 2023-03-22 NOTE — LETTER
March 22, 2023      [facility]gent [facility] - [place] [address], [address]  [facility] [address]  Phone: [phone]  Fax: [phone]       Patient: Mar Luna   YOB: 2000  Date of Visit: 03/22/2023    To Whom It May Concern:    Mahi Luna  was at [facility] on 03/22/2023. The patient may return to work/school on 3/23/2023 with no restrictions. If you have any questions or concerns, or if I can be of further assistance, please do not hesitate to contact me.    Sincerely,          Alexys Hoyt DNP, FNP

## 2023-03-22 NOTE — PROGRESS NOTES
Subjective:       Patient ID: Mar Luna is a 22 y.o. female.    Vitals:  temperature is 98 °F (36.7 °C). Her blood pressure is 105/63 and her pulse is 77. Her respiration is 16 and oxygen saturation is 98%.     Chief Complaint: Abdominal Pain    Pt presents with upper abd pain x 3 days, started with diarrhea every time she ate x 2 days, nausea.  Pt states is feeling better today but needs a note for work      Provider note starts here:    Patient is 22 y.o. female with c/o intermittent epigastric pain, nausea, and diarrhea x 2 days. She stated she at food from [employer] 2 days ago and her symptoms started the next day. She stated the nausea and diarrhea has resolved but still has some epigastric pain after she eats. She denies chest pain, SOB, fever, chills, abdominal pain.    Abdominal Pain  The current episode started in the past 7 days. The onset quality is sudden. The problem occurs constantly. The problem has been gradually improving. The pain is located in the generalized abdominal region. The pain is at a severity of 5/10. The pain is mild. The quality of the pain is sharp. The abdominal pain does not radiate. Associated symptoms include diarrhea and nausea. Pertinent negatives include no fever or vomiting. The pain is aggravated by eating and certain positions. Treatments tried: immodium, tums.     Constitution: Negative for fever.   Gastrointestinal:  Positive for abdominal pain, nausea and diarrhea. Negative for vomiting.     Objective:      Physical Exam   Constitutional: She is oriented to person, place, and time. She appears well-developed.   HENT:   Head: Normocephalic and atraumatic.   Ears:   Right Ear: External ear normal.   Left Ear: External ear normal.   Nose: Nose normal.   Mouth/Throat: Mucous membranes are normal.   Eyes: Conjunctivae and lids are normal.   Neck: Trachea normal. Neck supple.   Cardiovascular: Normal rate, regular rhythm and normal heart sounds.   Pulmonary/Chest:  Effort normal and breath sounds normal. No respiratory distress.   Abdominal: Normal appearance and bowel sounds are normal. She exhibits no distension and no mass. Soft. There is no abdominal tenderness.     Musculoskeletal: Normal range of motion.         General: Normal range of motion.   Neurological: She is alert and oriented to person, place, and time. She has normal strength.   Skin: Skin is warm, dry, intact, not diaphoretic and not pale.   Psychiatric: Her speech is normal and behavior is normal. Judgment and thought content normal.   Nursing note and vitals reviewed.      Assessment:       1. Food poisoning    2. Gastroenteritis          Plan:       Follow up if your symptoms fail to improve or if your symptoms worsen. PLEASE CONTACT YOUR PCP OR CONTACT THE EMERGENCY ROOM for further evaluation and treatment of your symptoms.    Patient Instructions:  -Rest  -Drink plenty of fluids  -Take Tylenol and /or Ibuprofen as directed as needed for fever/pain. Do not take more than the recommended dose.   -Follow up with your PCP within the next 1-2 weeks as needed.  -You must understand that you have received treatment at an Urgent Care only and that you may be released before all of your medical problems are known or treated.  -You, the patient will arrange for follow care as instructed.  -If your condition fails to improve or worsen, we recommend that you receive another evaluation at the Emergency Room or contact for primary care physician to discuss your concerns.    -If you smoke cigarettes, it would be beneficial for you to stop.   Food poisoning    Gastroenteritis

## 2023-03-28 DIAGNOSIS — G43.109 MIGRAINE WITH AURA AND WITHOUT STATUS MIGRAINOSUS, NOT INTRACTABLE: ICD-10-CM

## 2023-03-29 RX ORDER — RIZATRIPTAN BENZOATE 5 MG/1
TABLET, ORALLY DISINTEGRATING ORAL
Qty: 12 TABLET | Refills: 3 | OUTPATIENT
Start: 2023-03-29

## 2023-04-29 ENCOUNTER — PATIENT MESSAGE (OUTPATIENT)
Dept: OBSTETRICS AND GYNECOLOGY | Facility: CLINIC | Age: 23
End: 2023-04-29
Payer: MEDICAID

## 2023-05-02 ENCOUNTER — INITIAL PRENATAL (OUTPATIENT)
Dept: OBSTETRICS AND GYNECOLOGY | Facility: CLINIC | Age: 23
End: 2023-05-02
Payer: MEDICAID

## 2023-05-02 VITALS
SYSTOLIC BLOOD PRESSURE: 112 MMHG | DIASTOLIC BLOOD PRESSURE: 58 MMHG | WEIGHT: 152.56 LBS | BODY MASS INDEX: 25.39 KG/M2

## 2023-05-02 DIAGNOSIS — Z11.3 SCREEN FOR STD (SEXUALLY TRANSMITTED DISEASE): ICD-10-CM

## 2023-05-02 DIAGNOSIS — Z3A.01 6 WEEKS GESTATION OF PREGNANCY: ICD-10-CM

## 2023-05-02 DIAGNOSIS — Z12.4 ENCOUNTER FOR PAP SMEAR OF CERVIX WITH HPV DNA COTESTING: ICD-10-CM

## 2023-05-02 DIAGNOSIS — N91.0 DELAYED MENSES: Primary | ICD-10-CM

## 2023-05-02 LAB
B-HCG UR QL: POSITIVE
CTP QC/QA: YES

## 2023-05-02 PROCEDURE — 88141 PR  CYTOPATH CERV/VAG INTERPRET: ICD-10-PCS | Mod: ,,, | Performed by: PATHOLOGY

## 2023-05-02 PROCEDURE — 99204 OFFICE O/P NEW MOD 45 MIN: CPT | Mod: 25,TH,S$PBB, | Performed by: SPECIALIST

## 2023-05-02 PROCEDURE — 88175 CYTOPATH C/V AUTO FLUID REDO: CPT | Performed by: PATHOLOGY

## 2023-05-02 PROCEDURE — 81025 URINE PREGNANCY TEST: CPT | Mod: PBBFAC,PN | Performed by: SPECIALIST

## 2023-05-02 PROCEDURE — 99204 PR OFFICE/OUTPT VISIT, NEW, LEVL IV, 45-59 MIN: ICD-10-PCS | Mod: 25,TH,S$PBB, | Performed by: SPECIALIST

## 2023-05-02 PROCEDURE — 76817 TRANSVAGINAL US OBSTETRIC: CPT | Mod: 26,S$PBB,, | Performed by: SPECIALIST

## 2023-05-02 PROCEDURE — 99212 OFFICE O/P EST SF 10 MIN: CPT | Mod: PBBFAC,PN | Performed by: SPECIALIST

## 2023-05-02 PROCEDURE — 88141 CYTOPATH C/V INTERPRET: CPT | Mod: ,,, | Performed by: PATHOLOGY

## 2023-05-02 PROCEDURE — 99999 PR PBB SHADOW E&M-EST. PATIENT-LVL II: ICD-10-PCS | Mod: PBBFAC,,, | Performed by: SPECIALIST

## 2023-05-02 PROCEDURE — 76817 PR US, OB, TRANSVAG APPROACH: ICD-10-PCS | Mod: 26,S$PBB,, | Performed by: SPECIALIST

## 2023-05-02 PROCEDURE — 99999 PR PBB SHADOW E&M-EST. PATIENT-LVL II: CPT | Mod: PBBFAC,,, | Performed by: SPECIALIST

## 2023-05-02 PROCEDURE — 76817 TRANSVAGINAL US OBSTETRIC: CPT | Mod: PBBFAC,PN | Performed by: SPECIALIST

## 2023-05-02 PROCEDURE — 87624 HPV HI-RISK TYP POOLED RSLT: CPT | Performed by: SPECIALIST

## 2023-05-02 PROCEDURE — 87591 N.GONORRHOEAE DNA AMP PROB: CPT | Performed by: SPECIALIST

## 2023-05-02 RX ORDER — PNV NO.95/FERROUS FUM/FOLIC AC 28MG-0.8MG
1 TABLET ORAL
COMMUNITY
Start: 2023-04-20 | End: 2023-06-27 | Stop reason: CLARIF

## 2023-05-03 ENCOUNTER — LAB VISIT (OUTPATIENT)
Dept: LAB | Facility: HOSPITAL | Age: 23
End: 2023-05-03
Attending: SPECIALIST
Payer: MEDICAID

## 2023-05-03 ENCOUNTER — PATIENT MESSAGE (OUTPATIENT)
Dept: OBSTETRICS AND GYNECOLOGY | Facility: CLINIC | Age: 23
End: 2023-05-03
Payer: MEDICAID

## 2023-05-03 DIAGNOSIS — Z3A.01 6 WEEKS GESTATION OF PREGNANCY: ICD-10-CM

## 2023-05-03 PROCEDURE — 81220 CFTR GENE COM VARIANTS: CPT | Performed by: SPECIALIST

## 2023-05-03 PROCEDURE — 86592 SYPHILIS TEST NON-TREP QUAL: CPT | Performed by: SPECIALIST

## 2023-05-03 PROCEDURE — 87389 HIV-1 AG W/HIV-1&-2 AB AG IA: CPT | Mod: 91 | Performed by: SPECIALIST

## 2023-05-03 PROCEDURE — 86803 HEPATITIS C AB TEST: CPT | Performed by: SPECIALIST

## 2023-05-03 PROCEDURE — 87340 HEPATITIS B SURFACE AG IA: CPT | Performed by: SPECIALIST

## 2023-05-03 PROCEDURE — 85025 COMPLETE CBC W/AUTO DIFF WBC: CPT | Performed by: SPECIALIST

## 2023-05-03 PROCEDURE — 86762 RUBELLA ANTIBODY: CPT | Performed by: SPECIALIST

## 2023-05-03 PROCEDURE — 86900 BLOOD TYPING SEROLOGIC ABO: CPT | Performed by: SPECIALIST

## 2023-05-03 PROCEDURE — 84443 ASSAY THYROID STIM HORMONE: CPT | Performed by: SPECIALIST

## 2023-05-03 PROCEDURE — 36415 COLL VENOUS BLD VENIPUNCTURE: CPT | Mod: PN | Performed by: SPECIALIST

## 2023-05-03 NOTE — PROGRESS NOTES
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    diagnosed by psychiatry    Asthma     mostly exercise-induced now    Dyslexia     Headache, tension-type     Worsening headaches 2016    followed by neurology       Past Surgical History:   Procedure Laterality Date    TONSILLECTOMY  0 2017       Family History   Problem Relation Age of Onset    Breast cancer Paternal Grandmother     Cancer Paternal Grandmother 65        breast    Headaches Mother     No Known Problems Father     Cancer Maternal Grandfather         passed age 74    No Known Problems Sister     No Known Problems Brother     Colon cancer Neg Hx     Ovarian cancer Neg Hx        Social History     Socioeconomic History    Marital status: Single   Tobacco Use    Smoking status: Never    Smokeless tobacco: Never   Substance and Sexual Activity    Alcohol use: No    Drug use: No    Sexual activity: Yes     Partners: Male     Birth control/protection: None     Comment: Mother not aware   Social History Narrative    Senior year at [employer]       Current Outpatient Medications   Medication Sig Dispense Refill    ALPRAZolam (XANAX) 0.25 MG tablet Take by mouth 3 (three) times daily.      amLODIPine (NORVASC) 5 MG tablet Take 1 tablet (5 mg total) by mouth once daily. (Patient not taking: Reported on 2022) 30 tablet 0    citalopram (CELEXA) 20 MG tablet Take 1 tablet (20 mg total) by mouth once daily. 90 tablet 3    PRENATAL 28 mg iron- 800 mcg Tab Take 1 tablet by mouth.      rizatriptan (MAXALT-MLT) 5 MG disintegrating tablet Take at onset of migraine, can repeat in 2 hrs if needed.  No more than 2 tabs per day or 3 days/wk. (Patient not taking: Reported on 2023) 12 tablet 3    valACYclovir (VALTREX) 1000 MG tablet Take 0.5 tablets (500 mg total) by mouth once daily. (Patient not taking: Reported  on 5/2/2023) 45 tablet 3     No current facility-administered medications for this visit.       Review of patient's allergies indicates:   Allergen Reactions    Latex, natural rubber Hives       Review of System:   General: no chills, fever, night sweats, weight gain or weight loss  Psychological: no depression or suicidal ideation  Breasts: no new or changing breast lumps, nipple discharge or masses.  Respiratory: no cough, shortness of breath, or wheezing  Cardiovascular: no chest pain or dyspnea on exertion  Gastrointestinal: no abdominal pain, change in bowel habits, or black or bloody stools  Genito-Urinary: no incontinence, urinary frequency/urgency or vulvar/vaginal symptoms, pelvic pain or abnormal vaginal bleeding.  Musculoskeletal: no gait disturbance or muscular weakness                                              General Appearance    A and O x 4, Cooperative, no distress   Breasts    Abdomen   deferred  Soft, non-tender, bowel sounds active all four quadrants,  no masses, no organomegaly    Genitourinary:   External rectal exam shows no thrombosed external hemorrhoids.   Pelvic exam was performed with patient supine.  No labial fusion.  There is no rash, lesion or injury on the right labia.  There is no rash, lesion or injury on the left labia.  No bleeding and no signs of injury around the vaginal introitus, urethra is without lesions and well supported. The cervix is visualized with no discharge, lesions or friability.  No vaginal discharge found.  No significant Cystocele, Enterocele or rectocele, and uterus well supported.  Bimanual exam:  The urethra is normal to palpation and there are no palpable vaginal wall masses.  Uterus is not deviated, not enlarged, not fixed, normal shape and not tender.  Cervix exhibits no motion tenderness.   Right adnexum displays no mass and no tenderness.  Left adnexum displays no mass and no tenderness.   Extremities: Extremities normal, atraumatic, no cyanosis or  edema                     NOTE  NURSING PERSONAL PRESENT FOR ENTIRE PHYSICAL EXAM    Procedure TVS 6.5Mhz probe Positive twin gestation Two separate GS noted two distinct fetal pole with flicker noted x 2 CRL left 6w3d  CRL right 6w2d EDC 12/23/23    Discussed multifetal pregnancy and care plan  Will obtain PNP  Begin PNV  [place] po fluids  Repeat u/s on RTO    I spent a total of 50 minutes on the day of the visit. This includes face to face time and non-face to face time preparing to see the patient (eg, review of tests), obtaining and/or reviewing separately obtained history, documenting clinical information in the electronic or other health record, independently interpreting results and communicating results to the patient/family/caregiver, or care coordinator.  RTO 4 weeks

## 2023-05-04 ENCOUNTER — PATIENT MESSAGE (OUTPATIENT)
Dept: OBSTETRICS AND GYNECOLOGY | Facility: CLINIC | Age: 23
End: 2023-05-04
Payer: MEDICAID

## 2023-05-04 LAB
ABO + RH BLD: NORMAL
BASOPHILS # BLD AUTO: 0.03 K/UL (ref 0–0.2)
BASOPHILS NFR BLD: 0.3 % (ref 0–1.9)
BLD GP AB SCN CELLS X3 SERPL QL: NORMAL
C TRACH DNA SPEC QL NAA+PROBE: NOT DETECTED
DIFFERENTIAL METHOD: ABNORMAL
EOSINOPHIL # BLD AUTO: 0.1 K/UL (ref 0–0.5)
EOSINOPHIL NFR BLD: 0.7 % (ref 0–8)
ERYTHROCYTE [DISTWIDTH] IN BLOOD BY AUTOMATED COUNT: 12.3 % (ref 11.5–14.5)
HBV SURFACE AG SERPL QL IA: NORMAL
HCT VFR BLD AUTO: 39.2 % (ref 37–48.5)
HCV AB SERPL QL IA: NORMAL
HGB BLD-MCNC: 12.7 G/DL (ref 12–16)
HIV 1+2 AB+HIV1 P24 AG SERPL QL IA: REACTIVE
IMM GRANULOCYTES # BLD AUTO: 0.02 K/UL (ref 0–0.04)
IMM GRANULOCYTES NFR BLD AUTO: 0.2 % (ref 0–0.5)
LYMPHOCYTES # BLD AUTO: 1.5 K/UL (ref 1–4.8)
LYMPHOCYTES NFR BLD: 16.6 % (ref 18–48)
MCH RBC QN AUTO: 29.6 PG (ref 27–31)
MCHC RBC AUTO-ENTMCNC: 32.4 G/DL (ref 32–36)
MCV RBC AUTO: 91 FL (ref 82–98)
MONOCYTES # BLD AUTO: 0.6 K/UL (ref 0.3–1)
MONOCYTES NFR BLD: 6.3 % (ref 4–15)
N GONORRHOEA DNA SPEC QL NAA+PROBE: NOT DETECTED
NEUTROPHILS # BLD AUTO: 6.7 K/UL (ref 1.8–7.7)
NEUTROPHILS NFR BLD: 75.9 % (ref 38–73)
NRBC BLD-RTO: 0 /100 WBC
PLATELET # BLD AUTO: 224 K/UL (ref 150–450)
PMV BLD AUTO: 10.1 FL (ref 9.2–12.9)
RBC # BLD AUTO: 4.29 M/UL (ref 4–5.4)
RPR SER QL: NORMAL
RUBV IGG SER-ACNC: 31.7 IU/ML
RUBV IGG SER-IMP: REACTIVE
TSH SERPL DL<=0.005 MIU/L-ACNC: 0.65 UIU/ML (ref 0.4–4)
WBC # BLD AUTO: 8.78 K/UL (ref 3.9–12.7)

## 2023-05-05 LAB
HIV SUPPLEMENTAL ASSAY INTERPRETATION: NORMAL
HIV-1 RESULT: NEGATIVE
HIV-2 RESULT: NEGATIVE

## 2023-05-09 ENCOUNTER — PATIENT MESSAGE (OUTPATIENT)
Dept: OBSTETRICS AND GYNECOLOGY | Facility: CLINIC | Age: 23
End: 2023-05-09
Payer: MEDICAID

## 2023-05-09 LAB — CFTR MUT ANL BLD/T: NORMAL

## 2023-05-09 RX ORDER — PROMETHAZINE HYDROCHLORIDE 25 MG/1
25 SUPPOSITORY RECTAL EVERY 6 HOURS PRN
Qty: 12 SUPPOSITORY | Refills: 1 | Status: SHIPPED | OUTPATIENT
Start: 2023-05-09 | End: 2023-06-27 | Stop reason: CLARIF

## 2023-05-10 LAB
HPV HR 12 DNA SPEC QL NAA+PROBE: POSITIVE
HPV16 AG SPEC QL: NEGATIVE
HPV18 DNA SPEC QL NAA+PROBE: NEGATIVE

## 2023-05-11 ENCOUNTER — TELEPHONE (OUTPATIENT)
Dept: OBSTETRICS AND GYNECOLOGY | Facility: CLINIC | Age: 23
End: 2023-05-11
Payer: MEDICAID

## 2023-05-11 LAB
FINAL PATHOLOGIC DIAGNOSIS: ABNORMAL
Lab: ABNORMAL

## 2023-05-11 NOTE — TELEPHONE ENCOUNTER
----- Message from Keisha Lehman sent at 5/11/2023 10:17 AM CDT -----  Contact: pt [phone]  Type: Needs Medical Advice  Who Called:  Pt     Best Call Back Number: [phone]      Additional Information: Pt currently at [facility] and they are requesting clearance be faxed over to them before they see her.   Fax [phone]

## 2023-05-15 ENCOUNTER — TELEPHONE (OUTPATIENT)
Dept: OBSTETRICS AND GYNECOLOGY | Facility: CLINIC | Age: 23
End: 2023-05-15
Payer: MEDICAID

## 2023-05-15 NOTE — TELEPHONE ENCOUNTER
----- Message from Kasi Mary sent at 5/15/2023 11:40 AM CDT -----  Regarding: Return Call  Contact:  with [facility]  Type:  Patient Returning Call    Who Called:Aleyda with [facility]  Who Left Message for Patient:office nurse please call  Does the patient know what this is regarding?:Lab work on patient from 05/03/23  Would the patient rather a call back or a response via [facility]? call  Best Call Back Number:[phone]  Additional Information: Please call to advise.;

## 2023-05-18 ENCOUNTER — PATIENT MESSAGE (OUTPATIENT)
Dept: OBSTETRICS AND GYNECOLOGY | Facility: CLINIC | Age: 23
End: 2023-05-18
Payer: MEDICAID

## 2023-05-23 ENCOUNTER — TELEPHONE (OUTPATIENT)
Dept: OBSTETRICS AND GYNECOLOGY | Facility: CLINIC | Age: 23
End: 2023-05-23
Payer: MEDICAID

## 2023-05-23 NOTE — TELEPHONE ENCOUNTER
----- Message from Nicky Montilla sent at 5/23/2023  8:34 AM CDT -----  Contact: Heriberto  Type:  Needs Medical Advice    Who Called: Heriberto with Department  Health     Would the patient rather a call back or a response via [facility]? Call     Best Call Back Number: [phone]    Additional Information:  heriberto with Department [facility] would like to speak with the nurse in regards to lab work that patient had done on 5/3.     Please call to advise

## 2023-05-24 ENCOUNTER — TELEPHONE (OUTPATIENT)
Dept: OBSTETRICS AND GYNECOLOGY | Facility: CLINIC | Age: 23
End: 2023-05-24
Payer: MEDICAID

## 2023-05-24 NOTE — TELEPHONE ENCOUNTER
----- Message from Marie Mares sent at 5/24/2023  8:55 AM CDT -----  Contact: Ms. Maynard from [facility]  Type:  Patient Needs Advice    Who Called:  Ms. Maynard from [facility]   What is this regarding?:  She was calling about some labs and returning a call.   Best Call Back Number:  [phone]  Additional Information:  Please call back at the phone number listed above to advise. Thank you!

## 2023-05-30 ENCOUNTER — PATIENT MESSAGE (OUTPATIENT)
Dept: OBSTETRICS AND GYNECOLOGY | Facility: CLINIC | Age: 23
End: 2023-05-30

## 2023-05-30 ENCOUNTER — ROUTINE PRENATAL (OUTPATIENT)
Dept: OBSTETRICS AND GYNECOLOGY | Facility: CLINIC | Age: 23
End: 2023-05-30
Payer: MEDICAID

## 2023-05-30 VITALS
DIASTOLIC BLOOD PRESSURE: 62 MMHG | BODY MASS INDEX: 25.42 KG/M2 | WEIGHT: 152.75 LBS | SYSTOLIC BLOOD PRESSURE: 112 MMHG

## 2023-05-30 DIAGNOSIS — Z13.79 GENETIC TESTING: ICD-10-CM

## 2023-05-30 DIAGNOSIS — Z3A.10 10 WEEKS GESTATION OF PREGNANCY: Primary | ICD-10-CM

## 2023-05-30 PROCEDURE — 76801 PR US, OB <14WKS, TRANSABD, SINGLE GESTATION: ICD-10-PCS | Mod: 26,S$PBB,, | Performed by: SPECIALIST

## 2023-05-30 PROCEDURE — 99212 OFFICE O/P EST SF 10 MIN: CPT | Mod: PBBFAC,PN | Performed by: SPECIALIST

## 2023-05-30 PROCEDURE — 99213 PR OFFICE/OUTPT VISIT, EST, LEVL III, 20-29 MIN: ICD-10-PCS | Mod: 25,TH,S$PBB, | Performed by: SPECIALIST

## 2023-05-30 PROCEDURE — 76801 OB US < 14 WKS SINGLE FETUS: CPT | Mod: 26,S$PBB,, | Performed by: SPECIALIST

## 2023-05-30 PROCEDURE — 99213 OFFICE O/P EST LOW 20 MIN: CPT | Mod: 25,TH,S$PBB, | Performed by: SPECIALIST

## 2023-05-30 PROCEDURE — 76802 OB US < 14 WKS ADDL FETUS: CPT | Mod: PBBFAC,PN | Performed by: SPECIALIST

## 2023-05-30 PROCEDURE — 99999 PR PBB SHADOW E&M-EST. PATIENT-LVL II: ICD-10-PCS | Mod: PBBFAC,,, | Performed by: SPECIALIST

## 2023-05-30 PROCEDURE — 99999 PR PBB SHADOW E&M-EST. PATIENT-LVL II: CPT | Mod: PBBFAC,,, | Performed by: SPECIALIST

## 2023-05-31 NOTE — PROGRESS NOTES
Complaints today: none , No Bleeding or pains    /62   Wt 69.3 kg (152 lb 12.5 oz)   LMP 2023 (Exact Date)   BMI 25.42 kg/m²     22 y.o., at 10w4d by Estimated Date of Delivery: 23  Patient Active Problem List   Diagnosis    Adjustment disorder of adolescence    Asthma    Chronic migraine    Pre-conception counseling    Analgesic rebound headache    Fall    Dichorionic diamniotic twin pregnancy in third trimester    Amniotic fluid leaking    Migraine with aura and without status migrainosus, not intractable     OB History    Para Term  AB Living   2 1 1 0 0 2   SAB IAB Ectopic Multiple Live Births   0 0 0 1 2      # Outcome Date GA Lbr Mich/2nd Weight Sex Delivery Anes PTL Lv   2 Current            1A Term 20 38w0d / 00:27 2.588 kg (5 lb 11.3 oz) M Vag-Vacuum Spinal, EPI N ELIZA   1B Term 20 38w0d / 00:53 2.984 kg (6 lb 9.3 oz) F Vag-Forceps Spinal, EPI N ELIZA       Dating reviewed    Allergies and problem list reviewed and updated    Medical and surgical history reviewed    Prenatal labs reviewed and updated    Physical Exam:  ABD: soft, gravid, nontender,   Abd RTS 3.5Mhz head for FHR  Twin gest Twin A  , Twin B , viewed by pt    Assessment:  Twin gestation 10 weeks 3 d    Plan:   Discussed and will obtain CFDNA today  follow up 4 Weeks, bleeding/pain precautions

## 2023-06-01 ENCOUNTER — PATIENT MESSAGE (OUTPATIENT)
Dept: OBSTETRICS AND GYNECOLOGY | Facility: CLINIC | Age: 23
End: 2023-06-01
Payer: MEDICAID

## 2023-06-01 ENCOUNTER — TELEPHONE (OUTPATIENT)
Dept: OBSTETRICS AND GYNECOLOGY | Facility: CLINIC | Age: 23
End: 2023-06-01
Payer: MEDICAID

## 2023-06-01 DIAGNOSIS — Z21 HIV ANTIBODY POSITIVE: Primary | ICD-10-CM

## 2023-06-03 ENCOUNTER — PATIENT MESSAGE (OUTPATIENT)
Dept: ADMINISTRATIVE | Facility: OTHER | Age: 23
End: 2023-06-03
Payer: MEDICAID

## 2023-06-04 ENCOUNTER — PATIENT MESSAGE (OUTPATIENT)
Dept: OBSTETRICS AND GYNECOLOGY | Facility: CLINIC | Age: 23
End: 2023-06-04
Payer: MEDICAID

## 2023-06-05 ENCOUNTER — LAB VISIT (OUTPATIENT)
Dept: LAB | Facility: HOSPITAL | Age: 23
End: 2023-06-05
Attending: SPECIALIST
Payer: MEDICAID

## 2023-06-05 DIAGNOSIS — Z21 HIV ANTIBODY POSITIVE: ICD-10-CM

## 2023-06-05 PROCEDURE — 87536 HIV-1 QUANT&REVRSE TRNSCRPJ: CPT | Performed by: SPECIALIST

## 2023-06-05 PROCEDURE — 36415 COLL VENOUS BLD VENIPUNCTURE: CPT | Mod: PN | Performed by: SPECIALIST

## 2023-06-07 ENCOUNTER — PATIENT MESSAGE (OUTPATIENT)
Dept: OBSTETRICS AND GYNECOLOGY | Facility: CLINIC | Age: 23
End: 2023-06-07
Payer: MEDICAID

## 2023-06-07 LAB
HIV1 RNA # SERPL NAA+PROBE: NOT DETECTED COPIES/ML
HIV1 RNA SERPL QL NAA+PROBE: NOT DETECTED

## 2023-06-09 ENCOUNTER — PATIENT MESSAGE (OUTPATIENT)
Dept: OBSTETRICS AND GYNECOLOGY | Facility: CLINIC | Age: 23
End: 2023-06-09
Payer: MEDICAID

## 2023-06-13 ENCOUNTER — PATIENT MESSAGE (OUTPATIENT)
Dept: OBSTETRICS AND GYNECOLOGY | Facility: CLINIC | Age: 23
End: 2023-06-13
Payer: MEDICAID

## 2023-06-16 ENCOUNTER — PATIENT MESSAGE (OUTPATIENT)
Dept: OBSTETRICS AND GYNECOLOGY | Facility: CLINIC | Age: 23
End: 2023-06-16
Payer: MEDICAID

## 2023-06-28 PROBLEM — Z98.890 STATUS POST DILATATION AND CURETTAGE: Status: ACTIVE | Noted: 2023-06-28

## 2023-07-08 ENCOUNTER — PATIENT MESSAGE (OUTPATIENT)
Dept: OTHER | Facility: OTHER | Age: 23
End: 2023-07-08
Payer: MEDICAID

## 2023-09-02 ENCOUNTER — PATIENT MESSAGE (OUTPATIENT)
Dept: OTHER | Facility: OTHER | Age: 23
End: 2023-09-02
Payer: MEDICAID

## 2023-09-16 ENCOUNTER — PATIENT MESSAGE (OUTPATIENT)
Dept: OTHER | Facility: OTHER | Age: 23
End: 2023-09-16
Payer: MEDICAID

## 2023-09-30 ENCOUNTER — PATIENT MESSAGE (OUTPATIENT)
Dept: OTHER | Facility: OTHER | Age: 23
End: 2023-09-30
Payer: MEDICAID

## 2023-10-14 ENCOUNTER — PATIENT MESSAGE (OUTPATIENT)
Dept: OTHER | Facility: OTHER | Age: 23
End: 2023-10-14
Payer: MEDICAID

## 2023-10-28 ENCOUNTER — PATIENT MESSAGE (OUTPATIENT)
Dept: OTHER | Facility: OTHER | Age: 23
End: 2023-10-28
Payer: MEDICAID

## 2023-12-11 ENCOUNTER — TELEPHONE (OUTPATIENT)
Dept: NEUROLOGY | Facility: CLINIC | Age: 23
End: 2023-12-11
Payer: MEDICAID

## 2023-12-12 NOTE — TELEPHONE ENCOUNTER
----- Message from Mickey Cunningham MA sent at 12/11/2023  5:11 PM CST -----  Contact: [phone]    ----- Message -----  From: Panchito Resendez  Sent: 12/11/2023   1:49 PM CST  To: Moe Swann Staff    GAYLE MONCADA calling regarding Appointment Access  (message) Pt asking for a call back to help get schedule in with the provider

## 2023-12-19 ENCOUNTER — OFFICE VISIT (OUTPATIENT)
Dept: NEUROLOGY | Facility: CLINIC | Age: 23
End: 2023-12-19
Payer: MEDICAID

## 2023-12-19 DIAGNOSIS — G43.109 MIGRAINE WITH AURA AND WITHOUT STATUS MIGRAINOSUS, NOT INTRACTABLE: ICD-10-CM

## 2023-12-19 PROCEDURE — 1159F PR MEDICATION LIST DOCUMENTED IN MEDICAL RECORD: ICD-10-PCS | Mod: CPTII,95,, | Performed by: PHYSICIAN ASSISTANT

## 2023-12-19 PROCEDURE — 99214 PR OFFICE/OUTPT VISIT, EST, LEVL IV, 30-39 MIN: ICD-10-PCS | Mod: 95,,, | Performed by: PHYSICIAN ASSISTANT

## 2023-12-19 PROCEDURE — 1160F PR REVIEW ALL MEDS BY PRESCRIBER/CLIN PHARMACIST DOCUMENTED: ICD-10-PCS | Mod: CPTII,95,, | Performed by: PHYSICIAN ASSISTANT

## 2023-12-19 PROCEDURE — 1159F MED LIST DOCD IN RCRD: CPT | Mod: CPTII,95,, | Performed by: PHYSICIAN ASSISTANT

## 2023-12-19 PROCEDURE — 1160F RVW MEDS BY RX/DR IN RCRD: CPT | Mod: CPTII,95,, | Performed by: PHYSICIAN ASSISTANT

## 2023-12-19 PROCEDURE — 99214 OFFICE O/P EST MOD 30 MIN: CPT | Mod: 95,,, | Performed by: PHYSICIAN ASSISTANT

## 2023-12-19 RX ORDER — TOPIRAMATE 25 MG/1
25 TABLET ORAL DAILY
Qty: 30 TABLET | Refills: 5 | Status: SHIPPED | OUTPATIENT
Start: 2023-12-19 | End: 2024-02-19

## 2023-12-19 RX ORDER — RIZATRIPTAN BENZOATE 10 MG/1
TABLET, ORALLY DISINTEGRATING ORAL
Qty: 18 TABLET | Refills: 5 | Status: SHIPPED | OUTPATIENT
Start: 2023-12-19 | End: 2024-02-19 | Stop reason: SDUPTHER

## 2023-12-19 RX ORDER — TOPIRAMATE 25 MG/1
25 TABLET ORAL DAILY
Qty: 30 TABLET | Refills: 5 | Status: SHIPPED | OUTPATIENT
Start: 2023-12-19 | End: 2023-12-19

## 2023-12-19 RX ORDER — RIZATRIPTAN BENZOATE 10 MG/1
TABLET, ORALLY DISINTEGRATING ORAL
Qty: 18 TABLET | Refills: 5 | Status: SHIPPED | OUTPATIENT
Start: 2023-12-19 | End: 2023-12-19

## 2023-12-19 NOTE — PROGRESS NOTES
Established Patient     The patient location is: [place]  The chief complaint leading to consultation is: headache follow up visit    Visit type: audiovisual    Face to Face time with patient: 10 min  15 minutes of total time spent on the encounter, which includes face to face time and non-face to face time preparing to see the patient (eg, review of tests), Obtaining and/or reviewing separately obtained history, Documenting clinical information in the electronic or other health record, Independently interpreting results (not separately reported) and communicating results to the patient/family/caregiver, or Care coordination (not separately reported).     Each patient to whom he or she provides medical services by telemedicine is:  (1) informed of the relationship between the physician and patient and the respective role of any other health care provider with respect to management of the patient; and (2) notified that he or she may decline to receive medical services by telemedicine and may withdraw from such care at any time.    Notes:        SUBJECTIVE:  Patient ID: Mar Luna   Chief Complaint: Headache    History of Present Illness:  Mar Luna is a 23 y.o. female with migraines, asthma, anxiety who presents via virtual visit alone for follow-up of headaches.       12/19/2023 - Interval History:  Pt's ha's worsened since June around the time of pregnancy and subsequent miscarriage. Currently experiencing 25/30 ha days per month. Recent ED visit in which ha improved w/ migraine cocktail.   Maxalt 10mg - helps greatly as an abortive  Discussed options. Pt not currently family planning.   Plan: start tpx, continue maxalt, trigger mgmt, continue fluoxetine for anxiety, rtc 2-3 mo    Recommendations made at last Office Visit on 9/16/22:  - Discussed symptoms appear to be consistent with migraines. Discussed this with patient along with treatment options and patient agreed with the following plan  -  "abortive - try maxalt (if fails consider amerge) vs naproxen  - nausea - defers  - pt to inform clinic of any family planning status changes. Advised d/c of these meds if status changes. Discussed during visit today.   - asthma - avoid bb, mgmt per pcp  - risks, benefits, and potential side effects of maxalt, naproxen, gepants discussed   - alternative treatment options offered   - importance of healthy diet, regular exercise and sleep hygiene in the treatment of headaches    - Start tracking headaches via Migraine Buddy torey on phone   - RTC in 3 mo or sooner if needed     Treatments Tried:  Elavil  Propranolol  Celexa   fluoxetine  Fioricet  Imitrex 50mg tab - helps, "makes me feel bad"  Maxalt 10mg - helps  Ibuprofen 800mg - sometimes helps    Current Medications:    Current Outpatient Medications:     ALBUTEROL, REFILL, INHL, Inhale into the lungs., Disp: , Rfl:     ibuprofen (ADVIL,MOTRIN) 800 MG tablet, Take 1 tablet (800 mg total) by mouth every 8 (eight) hours as needed for Pain., Disp: 30 tablet, Rfl: 0    oxyCODONE-acetaminophen (PERCOCET) 5-325 mg per tablet, Take 1 tablet by mouth every 6 (six) hours as needed for Pain., Disp: 15 tablet, Rfl: 0    rizatriptan (MAXALT-MLT) 10 MG disintegrating tablet, Take 1 tab po at onset of migraine, can repeat in 2 hrs if needed.  No more than 2 tabs per day or 3 days/wk., Disp: 18 tablet, Rfl: 5    topiramate (TOPAMAX) 25 MG tablet, Take 1 tablet (25 mg total) by mouth once daily., Disp: 30 tablet, Rfl: 5    Review of Systems - as per HPI, otherwise a balanced 10 systems review is negative.    OBJECTIVE:  Vitals:  LMP 12/04/2023 (Exact Date)      Physical Exam:  Constitutional: she appears well-developed and well-nourished. she is well groomed. NAD   HENT:    Head: Normocephalic and atraumatic  Eyes: Conjunctivae and EOM are normal  Musculoskeletal: Normal range of motion. No joint stiffness.   Psychiatric: Mood and affect are normal    Neuro: Patient is alert and " oriented to person, place, and time. Language is intact and fluent. Speech is clear and fluent. Recent and remote memory are intact.  Normal attention and concentration.  Facial movement is symmetric. Moves all 4 extremities against gravity.      Review of Data:   Notes from neuro reviewed   Labs:  Admission on 12/07/2023, Discharged on 12/08/2023   Component Date Value Ref Range Status    SARS-CoV2 (COVID-19) Qualitative P* 12/07/2023 Negative  Negative Final    Influenza A, Molecular 12/07/2023 Negative  Negative Final    Influenza B, Molecular 12/07/2023 Negative  Negative Final    POC Preg Test, Ur 12/07/2023 Negative  Negative Final     Acceptable 12/07/2023 Yes   Final     Imaging:  Results for orders placed or performed during the hospital encounter of 11/21/16   CT Head Without Contrast    Narrative    CT head without.    Findings: The brain is normally formed and unremarkable.  The ventricular system is within normal limits of size for age and shows no distortion by mass effect.  There is no intra-or extra-axial mass or hemorrhage.  The visualized extracranial structures are unremarkable.    Impression     No acute intracranial abnormality.      Electronically signed by: TIM MCGOWAN MD  Date:     11/21/16  Time:    11:31      Note: I have independently reviewed any/all imaging/labs/tests and agree with the report (s) as documented.  Any discrepancies will be as noted/demarcated by free text.  YESENIA PAPPAS 12/19/2023    ASSESSMENT:  1. Migraine with aura and without status migrainosus, not intractable        PLAN:  - Discussed symptoms appear to be consistent with migraines. Discussed this with patient along with treatment options and patient agreed with the following plan  - ppx - start tpx  - abortive - continue maxalt 10mg  - nausea - defers  - pt to inform clinic of any family planning status changes. Advised d/c of these meds if status changes. Currently not family planning. Discussed during  visit today.   - asthma - avoid bb, mgmt per pcp  - Continue tracking headaches   - Discussed goals of therapy are to decrease the frequency, intensity, and duration of headaches  - RTC in 2-3 mo     Orders Placed This Encounter    rizatriptan (MAXALT-MLT) 10 MG disintegrating tablet    topiramate (TOPAMAX) 25 MG tablet       Discussed potential for teratogenicity with treatment, patient understands if her family planning status should change she will contact office immediately and we will safely adjust medications as needed.     Questions and concerns were sought and answered to the patient's stated verbal satisfaction.  The patient verbalizes understanding and agreement with the above stated treatment plan.     CC: Nichole Jones MD Sarena Patel, PA-C  [facility] [place]   [phone]    Dr. Lee was available during today's encounter.

## 2024-02-19 ENCOUNTER — OFFICE VISIT (OUTPATIENT)
Dept: NEUROLOGY | Facility: CLINIC | Age: 24
End: 2024-02-19
Payer: MEDICAID

## 2024-02-19 DIAGNOSIS — G43.109 MIGRAINE WITH AURA AND WITHOUT STATUS MIGRAINOSUS, NOT INTRACTABLE: Primary | ICD-10-CM

## 2024-02-19 PROCEDURE — 1159F MED LIST DOCD IN RCRD: CPT | Mod: CPTII,95,, | Performed by: PHYSICIAN ASSISTANT

## 2024-02-19 PROCEDURE — 1160F RVW MEDS BY RX/DR IN RCRD: CPT | Mod: CPTII,95,, | Performed by: PHYSICIAN ASSISTANT

## 2024-02-19 PROCEDURE — 99214 OFFICE O/P EST MOD 30 MIN: CPT | Mod: 95,,, | Performed by: PHYSICIAN ASSISTANT

## 2024-02-19 PROCEDURE — G2211 COMPLEX E/M VISIT ADD ON: HCPCS | Mod: 95,,, | Performed by: PHYSICIAN ASSISTANT

## 2024-02-19 RX ORDER — RIZATRIPTAN BENZOATE 10 MG/1
TABLET, ORALLY DISINTEGRATING ORAL
Qty: 18 TABLET | Refills: 5 | Status: SHIPPED | OUTPATIENT
Start: 2024-02-19 | End: 2024-05-13 | Stop reason: SDUPTHER

## 2024-02-19 RX ORDER — TOPIRAMATE 25 MG/1
25 TABLET ORAL DAILY
Qty: 30 TABLET | Refills: 5 | Status: SHIPPED | OUTPATIENT
Start: 2024-02-19 | End: 2024-05-13

## 2024-05-06 ENCOUNTER — PATIENT MESSAGE (OUTPATIENT)
Dept: NEUROLOGY | Facility: CLINIC | Age: 24
End: 2024-05-06

## 2024-05-13 ENCOUNTER — OFFICE VISIT (OUTPATIENT)
Dept: NEUROLOGY | Facility: CLINIC | Age: 24
End: 2024-05-13
Payer: MEDICAID

## 2024-05-13 DIAGNOSIS — G43.109 MIGRAINE WITH AURA AND WITHOUT STATUS MIGRAINOSUS, NOT INTRACTABLE: ICD-10-CM

## 2024-05-13 PROCEDURE — 99214 OFFICE O/P EST MOD 30 MIN: CPT | Mod: 95,,, | Performed by: PHYSICIAN ASSISTANT

## 2024-05-13 PROCEDURE — 1160F RVW MEDS BY RX/DR IN RCRD: CPT | Mod: CPTII,95,, | Performed by: PHYSICIAN ASSISTANT

## 2024-05-13 PROCEDURE — 1159F MED LIST DOCD IN RCRD: CPT | Mod: CPTII,95,, | Performed by: PHYSICIAN ASSISTANT

## 2024-05-13 RX ORDER — RIZATRIPTAN BENZOATE 10 MG/1
TABLET, ORALLY DISINTEGRATING ORAL
Qty: 18 TABLET | Refills: 5 | Status: SHIPPED | OUTPATIENT
Start: 2024-05-13 | End: 2025-05-13

## 2024-05-13 RX ORDER — TOPIRAMATE 25 MG/1
25 TABLET ORAL DAILY
Qty: 30 TABLET | Refills: 0 | Status: SHIPPED | OUTPATIENT
Start: 2024-05-13 | End: 2024-05-30 | Stop reason: SDUPTHER

## 2024-05-13 RX ORDER — RIZATRIPTAN BENZOATE 10 MG/1
TABLET, ORALLY DISINTEGRATING ORAL
Qty: 18 TABLET | Refills: 5 | Status: SHIPPED | OUTPATIENT
Start: 2024-05-13 | End: 2024-05-13

## 2024-05-13 NOTE — PROGRESS NOTES
Established Patient     The patient location is: [place]  The chief complaint leading to consultation is: headache follow up visit    Visit type: audiovisual    Face to Face time with patient: 6 min  15 minutes of total time spent on the encounter, which includes face to face time and non-face to face time preparing to see the patient (eg, review of tests), Obtaining and/or reviewing separately obtained history, Documenting clinical information in the electronic or other health record, Independently interpreting results (not separately reported) and communicating results to the patient/family/caregiver, or Care coordination (not separately reported).     Each patient to whom he or she provides medical services by telemedicine is:  (1) informed of the relationship between the physician and patient and the respective role of any other health care provider with respect to management of the patient; and (2) notified that he or she may decline to receive medical services by telemedicine and may withdraw from such care at any time.    Notes:        SUBJECTIVE:  Patient ID: Mar Luna   Chief Complaint: No chief complaint on file.    History of Present Illness:  Mar Luna is a 23 y.o. female with migraines, asthma, anxiety who presents via virtual visit alone for follow-up of headaches.       05/13/2024 - Interval History:  Daly's have remained well controlled on current regimen and triggers have decreased. Was able to d/c fluoxetine for anxiety w/ good control mantained. Is amenable to weaning off tpx at this time.   Plan: wean off tpx, continue maxalt prn, rtc prn    02/19/2024 - Interval History:  Ha's have become well controlled since starting tpx. Has had 3 ha's since last visit around times of triggers. Still having abnormal uterine bleeding post miscarriage w/ mgmt per obgyn.   Plan:  continue tpx, maxalt, trigger mgmt, fluoxetine for anxiety, rtc 3 mo    12/19/2023 - Interval History:  Pt's ha's  "worsened since June around the time of pregnancy and subsequent miscarriage. Currently experiencing 25/30 ha days per month. Recent ED visit in which ha improved w/ migraine cocktail.   Maxalt 10mg - helps greatly as an abortive  Discussed options. Pt not currently family planning.   Plan: start tpx, continue maxalt, trigger mgmt, continue fluoxetine for anxiety, rtc 2-3 mo    Recommendations made at last Office Visit on 9/16/22:  - Discussed symptoms appear to be consistent with migraines. Discussed this with patient along with treatment options and patient agreed with the following plan  - abortive - try maxalt (if fails consider amerge) vs naproxen  - nausea - defers  - pt to inform clinic of any family planning status changes. Advised d/c of these meds if status changes. Discussed during visit today.   - asthma - avoid bb, mgmt per pcp  - risks, benefits, and potential side effects of maxalt, naproxen, gepants discussed   - alternative treatment options offered   - importance of healthy diet, regular exercise and sleep hygiene in the treatment of headaches    - Start tracking headaches via Migraine Buddy torey on phone   - RTC in 3 mo or sooner if needed     Treatments Tried:  Elavil  Propranolol  Celexa   Fluoxetine  Tpx - helps  Fioricet  Imitrex 50mg tab - helps, "makes me feel bad"  Maxalt 10mg - helps  Ibuprofen 800mg - sometimes helps    Current Medications:    Current Outpatient Medications:     ALBUTEROL, REFILL, INHL, Inhale into the lungs., Disp: , Rfl:     ibuprofen (ADVIL,MOTRIN) 800 MG tablet, Take 1 tablet (800 mg total) by mouth every 8 (eight) hours as needed for Pain., Disp: 30 tablet, Rfl: 0    oxyCODONE-acetaminophen (PERCOCET) 5-325 mg per tablet, Take 1 tablet by mouth every 6 (six) hours as needed for Pain., Disp: 15 tablet, Rfl: 0    rizatriptan (MAXALT-MLT) 10 MG disintegrating tablet, Take 1 tab po at onset of migraine, can repeat in 2 hrs if needed.  No more than 2 tabs per day or 3 " days/wk., Disp: 18 tablet, Rfl: 5    topiramate (TOPAMAX) 25 MG tablet, Take 1 tablet (25 mg total) by mouth once daily., Disp: 30 tablet, Rfl: 0    Review of Systems - as per HPI, otherwise a balanced 10 systems review is negative.    OBJECTIVE:  Vitals:  There were no vitals taken for this visit.     Physical Exam:  Constitutional: she appears well-developed and well-nourished. she is well groomed. NAD   HENT:    Head: Normocephalic and atraumatic  Eyes: Conjunctivae and EOM are normal  Musculoskeletal: Normal range of motion. No joint stiffness.   Psychiatric: Mood and affect are normal    Neuro: Patient is alert and oriented to person, place, and time. Language is intact and fluent. Speech is clear and fluent. Recent and remote memory are intact.  Normal attention and concentration.  Facial movement is symmetric. Moves all 4 extremities against gravity.      Review of Data:   Notes from neuro reviewed   Labs:  No visits with results within 3 Month(s) from this visit.   Latest known visit with results is:   Admission on 12/07/2023, Discharged on 12/08/2023   Component Date Value Ref Range Status    SARS-CoV2 (COVID-19) Qualitative P* 12/07/2023 Negative  Negative Final    Influenza A, Molecular 12/07/2023 Negative  Negative Final    Influenza B, Molecular 12/07/2023 Negative  Negative Final    POC Preg Test, Ur 12/07/2023 Negative  Negative Final     Acceptable 12/07/2023 Yes   Final     Imaging:  Results for orders placed or performed during the hospital encounter of 11/21/16   CT Head Without Contrast    Narrative    CT head without.    Findings: The brain is normally formed and unremarkable.  The ventricular system is within normal limits of size for age and shows no distortion by mass effect.  There is no intra-or extra-axial mass or hemorrhage.  The visualized extracranial structures are unremarkable.    Impression     No acute intracranial abnormality.      Electronically signed by: TIM  ROSLYN KEN  Date:     11/21/16  Time:    11:31      Note: I have independently reviewed any/all imaging/labs/tests and agree with the report (s) as documented.  Any discrepancies will be as noted/demarcated by free text.  YESENIA PAPPAS 5/13/2024    ASSESSMENT:  1. Migraine with aura and without status migrainosus, not intractable          PLAN:  - Discussed symptoms appear to be consistent with migraines. Discussed this with patient along with treatment options and patient agreed with the following plan  - ppx - wean off tpx  - abortive - continue maxalt 10mg  - nausea - defers  - asthma - avoid bb, mgmt per pcp  - Continue tracking headaches   - Discussed goals of therapy are to decrease the frequency, intensity, and duration of headaches  - RTC prn     Orders Placed This Encounter    topiramate (TOPAMAX) 25 MG tablet    rizatriptan (MAXALT-MLT) 10 MG disintegrating tablet         Discussed potential for teratogenicity with treatment, patient understands if her family planning status should change she will contact office immediately and we will safely adjust medications as needed.     Questions and concerns were sought and answered to the patient's stated verbal satisfaction.  The patient verbalizes understanding and agreement with the above stated treatment plan.     CC: Nichole Jones MD Sarena Patel, PA-C  [facility]   [phone]    Dr. Lee was available during today's encounter.

## 2024-05-24 ENCOUNTER — PATIENT MESSAGE (OUTPATIENT)
Dept: NEUROLOGY | Facility: CLINIC | Age: 24
End: 2024-05-24
Payer: MEDICAID

## 2024-05-24 DIAGNOSIS — G43.109 MIGRAINE WITH AURA AND WITHOUT STATUS MIGRAINOSUS, NOT INTRACTABLE: ICD-10-CM

## 2024-05-30 RX ORDER — TOPIRAMATE 25 MG/1
25 TABLET ORAL DAILY
Qty: 30 TABLET | Refills: 2 | Status: SHIPPED | OUTPATIENT
Start: 2024-05-30 | End: 2024-08-28

## 2024-11-01 ENCOUNTER — PROCEDURE VISIT (OUTPATIENT)
Dept: MATERNAL FETAL MEDICINE | Facility: CLINIC | Age: 24
End: 2024-11-01
Payer: MEDICAID

## 2024-11-01 DIAGNOSIS — Z34.92 NORMAL PREGNANCY IN SECOND TRIMESTER: ICD-10-CM

## 2024-11-01 DIAGNOSIS — Z36.89 ENCOUNTER FOR FETAL ANATOMIC SURVEY: ICD-10-CM

## 2024-11-01 PROCEDURE — 76805 OB US >/= 14 WKS SNGL FETUS: CPT | Mod: PBBFAC,PN | Performed by: OBSTETRICS & GYNECOLOGY

## 2024-12-21 DIAGNOSIS — G43.109 MIGRAINE WITH AURA AND WITHOUT STATUS MIGRAINOSUS, NOT INTRACTABLE: ICD-10-CM

## 2024-12-23 RX ORDER — TOPIRAMATE 25 MG/1
25 TABLET ORAL DAILY
Qty: 30 TABLET | Refills: 5 | Status: SHIPPED | OUTPATIENT
Start: 2024-12-23 | End: 2025-06-21

## 2025-02-05 ENCOUNTER — OFFICE VISIT (OUTPATIENT)
Dept: MATERNAL FETAL MEDICINE | Facility: CLINIC | Age: 25
End: 2025-02-05
Payer: MEDICAID

## 2025-02-05 ENCOUNTER — PROCEDURE VISIT (OUTPATIENT)
Dept: MATERNAL FETAL MEDICINE | Facility: CLINIC | Age: 25
End: 2025-02-05
Payer: MEDICAID

## 2025-02-05 VITALS
HEIGHT: 66 IN | SYSTOLIC BLOOD PRESSURE: 108 MMHG | DIASTOLIC BLOOD PRESSURE: 56 MMHG | HEART RATE: 73 BPM | BODY MASS INDEX: 27.28 KG/M2 | WEIGHT: 169.75 LBS

## 2025-02-05 DIAGNOSIS — Z36.89 ENCOUNTER FOR ULTRASOUND TO CHECK FETAL GROWTH: Primary | ICD-10-CM

## 2025-02-05 DIAGNOSIS — Z36.89 ENCOUNTER FOR ULTRASOUND TO CHECK FETAL GROWTH: ICD-10-CM

## 2025-02-05 DIAGNOSIS — N81.10 VAGINAL WALL PROLAPSE: Primary | ICD-10-CM

## 2025-02-05 PROCEDURE — 76816 OB US FOLLOW-UP PER FETUS: CPT | Mod: PBBFAC,PN | Performed by: STUDENT IN AN ORGANIZED HEALTH CARE EDUCATION/TRAINING PROGRAM

## 2025-02-05 PROCEDURE — 99205 OFFICE O/P NEW HI 60 MIN: CPT | Mod: S$PBB,TH,, | Performed by: STUDENT IN AN ORGANIZED HEALTH CARE EDUCATION/TRAINING PROGRAM

## 2025-02-05 NOTE — PROGRESS NOTES
MATERNAL-FETAL MEDICINE   CONSULT NOTE    Provider requesting consultation:   SUBJECTIVE:   Ms. Mar Luna is a 24 y.o.  female with IUP at 33w4d who is seen in consultation by MFM for evaluation and management of:  Problem   Vaginal Wall Prolapse          Review of patient's allergies indicates:   Allergen Reactions    Latex, natural rubber Hives     OB History    Para Term  AB Living   3 1 1 0 0 2   SAB IAB Ectopic Multiple Live Births   0 0 0 1 2      # Outcome Date GA Lbr Mich/2nd Weight Sex Type Anes PTL Lv   3 Current            2A Term 20 38w0d / 00:27 2.588 kg (5 lb 11.3 oz) M Vag-Vacuum Spinal, EPI N ELIZA   2B Term 20 38w0d / 00:53 2.984 kg (6 lb 9.3 oz) F Vag-Forceps Spinal, EPI N ELIZA   1               Past Medical History:   Diagnosis Date    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   Social History     Tobacco Use    Smoking status: Never    Smokeless tobacco: Never   Substance Use Topics    Alcohol use: No    Drug use: No     Review of patient's allergies indicates:   Allergen Reactions    Latex, natural rubber Hives     Objective:   108/56  Ultrasound performed. See viewpoint for full ultrasound report.  Fetal size is AGA with the EFW at the 28% and the AC at the 75%. The EFW is 2252 g.  A limited repeat fetal anatomic survey shows no abnormalities of the structures that were adequately imaged.  AFV is normal.   ASSESSMENT/PLAN:     24 y.o.  female with IUP at 33w4d     Vaginal wall  prolapse  Mar presents as a consult for pelvic exam due to a mass found in the vagina. Mar reports feeling pressure over the weekend and on exam, noted bluish tissue at the vaginal introitus. Dr. Brink called me to discuss her own assessment as well.  On my exam today, there is a 1 x 1 x 1 cm of redundant tissue appearing vaginal in origin due to location and rugae visualized in tissue. The collection has a bluish-red tinged color that blanches with pressure. No active or pulsating vessels visualized or palpated on exam. Differential includes but is not limited to anterior vaginal wall prolapse, vaginal varicosities, pelvic congestion syndrome, or less likely, dysplastic tissue.   I do not believe this should preclude a vaginal delivery or change delivery timing however recommend adequate anesthesia and pain control prior to delivery. I gave the patient intrapartum and antepartum bleeding precautions. She reports the area seems to have decreased in size from the weekend.     Recommendations:  Delivery per routine obstetric indications  Recommend adequate anesthesia and pain control prior to delivery  May benefit from GYN ONC evaluation- for either biopsy postpartum or  additionally if varicosity which is bothersome- could benefit from excision or sclerotherapy  Avoid straining in pregnancy and postpartum. Recommend use of stool softeners      FOLLOW UP:   No further ultrasounds or visits were scheduled        Franko Arenas  Maternal-Fetal Medicine    Electronically Signed by Franko Arenas February 5, 2025

## 2025-02-05 NOTE — ASSESSMENT & PLAN NOTE
Mar presents as a consult for pelvic exam due to a mass found in the vagina. Mar reports feeling pressure over the weekend and on exam, noted bluish tissue at the vaginal introitus. Dr. Brink called me to discuss her own assessment as well.  On my exam today, there is a 1 x 1 x 1 cm of redundant tissue appearing vaginal in origin due to location and rugae visualized in tissue. The collection has a bluish-red tinged color that blanches with pressure. No active or pulsating vessels visualized or palpated on exam. Differential includes but is not limited to anterior vaginal wall prolapse, vaginal varicosities, pelvic congestion syndrome, or less likely, dysplastic tissue.   I do not believe this should preclude a vaginal delivery or change delivery timing however recommend adequate anesthesia and pain control prior to delivery. I gave the patient intrapartum and antepartum bleeding precautions. She reports the area seems to have decreased in size from the weekend.     Recommendations:  Delivery per routine obstetric indications  Recommend adequate anesthesia and pain control prior to delivery  May benefit from GYN ONC evaluation- for either biopsy postpartum or  additionally if varicosity which is bothersome- could benefit from excision or sclerotherapy  Avoid straining in pregnancy and postpartum. Recommend use of stool softeners

## 2025-03-19 NOTE — PROGRESS NOTES
Abdomen , soft, nontender, nondistended , no guarding or rigidity , no masses palpable , normal bowel sounds , Liver and Spleen,  no hepatosplenomegaly , liver nontender. Established Patient     The patient location is: [place]  The chief complaint leading to consultation is: headache follow up visit    Visit type: audiovisual    Face to Face time with patient: 3 min  8 minutes of total time spent on the encounter, which includes face to face time and non-face to face time preparing to see the patient (eg, review of tests), Obtaining and/or reviewing separately obtained history, Documenting clinical information in the electronic or other health record, Independently interpreting results (not separately reported) and communicating results to the patient/family/caregiver, or Care coordination (not separately reported).     Each patient to whom he or she provides medical services by telemedicine is:  (1) informed of the relationship between the physician and patient and the respective role of any other health care provider with respect to management of the patient; and (2) notified that he or she may decline to receive medical services by telemedicine and may withdraw from such care at any time.    Notes:        SUBJECTIVE:  Patient ID: Mar Luna   Chief Complaint: No chief complaint on file.    History of Present Illness:  Mar Luna is a 23 y.o. female with migraines, asthma, anxiety who presents via virtual visit alone for follow-up of headaches.       02/19/2024 - Interval History:  Ha's have become well controlled since starting tpx. Has had 3 ha's since last visit around times of triggers. Still having abnormal uterine bleeding post miscarriage w/ mgmt per obgyn.   Plan:  continue tpx, maxalt, trigger mgmt, fluoxetine for anxiety, rtc 3 mo    12/19/2023 - Interval History:  Pt's ha's worsened since June around the time of pregnancy and subsequent miscarriage. Currently experiencing 25/30 ha days per month. Recent ED visit in which ha improved w/ migraine cocktail.   Maxalt 10mg - helps greatly as an abortive  Discussed options. Pt not currently family planning.  "  Plan: start tpx, continue maxalt, trigger mgmt, continue fluoxetine for anxiety, rtc 2-3 mo    Recommendations made at last Office Visit on 9/16/22:  - Discussed symptoms appear to be consistent with migraines. Discussed this with patient along with treatment options and patient agreed with the following plan  - abortive - try maxalt (if fails consider amerge) vs naproxen  - nausea - defers  - pt to inform clinic of any family planning status changes. Advised d/c of these meds if status changes. Discussed during visit today.   - asthma - avoid bb, mgmt per pcp  - risks, benefits, and potential side effects of maxalt, naproxen, gepants discussed   - alternative treatment options offered   - importance of healthy diet, regular exercise and sleep hygiene in the treatment of headaches    - Start tracking headaches via Migraine Buddy torey on phone   - RTC in 3 mo or sooner if needed     Treatments Tried:  Elavil  Propranolol  Celexa   Fluoxetine  Tpx - helps  Fioricet  Imitrex 50mg tab - helps, "makes me feel bad"  Maxalt 10mg - helps  Ibuprofen 800mg - sometimes helps    Current Medications:    Current Outpatient Medications:     ALBUTEROL, REFILL, INHL, Inhale into the lungs., Disp: , Rfl:     ibuprofen (ADVIL,MOTRIN) 800 MG tablet, Take 1 tablet (800 mg total) by mouth every 8 (eight) hours as needed for Pain., Disp: 30 tablet, Rfl: 0    oxyCODONE-acetaminophen (PERCOCET) 5-325 mg per tablet, Take 1 tablet by mouth every 6 (six) hours as needed for Pain., Disp: 15 tablet, Rfl: 0    rizatriptan (MAXALT-MLT) 10 MG disintegrating tablet, Take 1 tab po at onset of migraine, can repeat in 2 hrs if needed.  No more than 2 tabs per day or 3 days/wk., Disp: 18 tablet, Rfl: 5    topiramate (TOPAMAX) 25 MG tablet, Take 1 tablet (25 mg total) by mouth once daily., Disp: 30 tablet, Rfl: 5    Review of Systems - as per HPI, otherwise a balanced 10 systems review is negative.    OBJECTIVE:  Vitals:  There were no vitals taken for " this visit.     Physical Exam:  Constitutional: she appears well-developed and well-nourished. she is well groomed. NAD   HENT:    Head: Normocephalic and atraumatic  Eyes: Conjunctivae and EOM are normal  Musculoskeletal: Normal range of motion. No joint stiffness.   Psychiatric: Mood and affect are normal    Neuro: Patient is alert and oriented to person, place, and time. Language is intact and fluent. Speech is clear and fluent. Recent and remote memory are intact.  Normal attention and concentration.  Facial movement is symmetric. Moves all 4 extremities against gravity.      Review of Data:   Notes from neuro reviewed   Labs:  Admission on 12/07/2023, Discharged on 12/08/2023   Component Date Value Ref Range Status    SARS-CoV2 (COVID-19) Qualitative P* 12/07/2023 Negative  Negative Final    Influenza A, Molecular 12/07/2023 Negative  Negative Final    Influenza B, Molecular 12/07/2023 Negative  Negative Final    POC Preg Test, Ur 12/07/2023 Negative  Negative Final     Acceptable 12/07/2023 Yes   Final     Imaging:  Results for orders placed or performed during the hospital encounter of 11/21/16   CT Head Without Contrast    Narrative    CT head without.    Findings: The brain is normally formed and unremarkable.  The ventricular system is within normal limits of size for age and shows no distortion by mass effect.  There is no intra-or extra-axial mass or hemorrhage.  The visualized extracranial structures are unremarkable.    Impression     No acute intracranial abnormality.      Electronically signed by: TIM MCGOWAN MD  Date:     11/21/16  Time:    11:31      Note: I have independently reviewed any/all imaging/labs/tests and agree with the report (s) as documented.  Any discrepancies will be as noted/demarcated by free text.  MIAN PAGLORIA 2/19/2024    ASSESSMENT:  No diagnosis found.      PLAN:  - Discussed symptoms appear to be consistent with migraines. Discussed this with patient along with  treatment options and patient agreed with the following plan  - ppx - continue tpx  - abortive - continue maxalt 10mg  - nausea - defers  - pt to inform clinic of any family planning status changes. Advised d/c of these meds if status changes. Currently not family planning. Discussed during visit today.   - asthma - avoid bb, mgmt per pcp  - Continue tracking headaches   - Discussed goals of therapy are to decrease the frequency, intensity, and duration of headaches  - RTC in 3 mo            Discussed potential for teratogenicity with treatment, patient understands if her family planning status should change she will contact office immediately and we will safely adjust medications as needed.     Questions and concerns were sought and answered to the patient's stated verbal satisfaction.  The patient verbalizes understanding and agreement with the above stated treatment plan.     CC: Nichole Jones MD Sarena Patel, PA-C  [facility]   [phone]    Dr. Lee was available during today's encounter.     Visit today is associated with current or anticipated ongoing medical care related to this patients single serious condition/complex condition of migraines. Plan for patient to return to the office in approximately 3 months to continue ongoing care with patient.